# Patient Record
Sex: FEMALE | Race: WHITE | NOT HISPANIC OR LATINO | URBAN - METROPOLITAN AREA
[De-identification: names, ages, dates, MRNs, and addresses within clinical notes are randomized per-mention and may not be internally consistent; named-entity substitution may affect disease eponyms.]

---

## 2018-04-03 ENCOUNTER — INPATIENT (INPATIENT)
Facility: HOSPITAL | Age: 83
LOS: 2 days | Discharge: ANOTHER HOSPITAL | DRG: 65 | End: 2018-04-06
Attending: STUDENT IN AN ORGANIZED HEALTH CARE EDUCATION/TRAINING PROGRAM | Admitting: FAMILY MEDICINE
Payer: MEDICARE

## 2018-04-03 VITALS
HEIGHT: 61 IN | HEART RATE: 113 BPM | DIASTOLIC BLOOD PRESSURE: 73 MMHG | RESPIRATION RATE: 18 BRPM | TEMPERATURE: 97 F | WEIGHT: 210.1 LBS | OXYGEN SATURATION: 99 % | SYSTOLIC BLOOD PRESSURE: 178 MMHG

## 2018-04-03 DIAGNOSIS — I48.91 UNSPECIFIED ATRIAL FIBRILLATION: ICD-10-CM

## 2018-04-03 DIAGNOSIS — I25.10 ATHEROSCLEROTIC HEART DISEASE OF NATIVE CORONARY ARTERY WITHOUT ANGINA PECTORIS: ICD-10-CM

## 2018-04-03 DIAGNOSIS — I10 ESSENTIAL (PRIMARY) HYPERTENSION: ICD-10-CM

## 2018-04-03 DIAGNOSIS — I63.9 CEREBRAL INFARCTION, UNSPECIFIED: ICD-10-CM

## 2018-04-03 DIAGNOSIS — E11.9 TYPE 2 DIABETES MELLITUS WITHOUT COMPLICATIONS: ICD-10-CM

## 2018-04-03 DIAGNOSIS — Z98.890 OTHER SPECIFIED POSTPROCEDURAL STATES: Chronic | ICD-10-CM

## 2018-04-03 DIAGNOSIS — Z29.9 ENCOUNTER FOR PROPHYLACTIC MEASURES, UNSPECIFIED: ICD-10-CM

## 2018-04-03 LAB
ANION GAP SERPL CALC-SCNC: 12 MMOL/L — SIGNIFICANT CHANGE UP (ref 5–17)
APTT BLD: 28.3 SEC — SIGNIFICANT CHANGE UP (ref 27.5–37.4)
BUN SERPL-MCNC: 32 MG/DL — HIGH (ref 7–23)
CALCIUM SERPL-MCNC: 9.4 MG/DL — SIGNIFICANT CHANGE UP (ref 8.5–10.1)
CHLORIDE SERPL-SCNC: 112 MMOL/L — HIGH (ref 96–108)
CO2 SERPL-SCNC: 20 MMOL/L — LOW (ref 22–31)
CREAT SERPL-MCNC: 1.3 MG/DL — SIGNIFICANT CHANGE UP (ref 0.5–1.3)
GLUCOSE SERPL-MCNC: 228 MG/DL — HIGH (ref 70–99)
HCT VFR BLD CALC: 41.6 % — SIGNIFICANT CHANGE UP (ref 34.5–45)
HGB BLD-MCNC: 13.2 G/DL — SIGNIFICANT CHANGE UP (ref 11.5–15.5)
INR BLD: 1.22 RATIO — HIGH (ref 0.88–1.16)
MCHC RBC-ENTMCNC: 28.3 PG — SIGNIFICANT CHANGE UP (ref 27–34)
MCHC RBC-ENTMCNC: 31.8 GM/DL — LOW (ref 32–36)
MCV RBC AUTO: 89 FL — SIGNIFICANT CHANGE UP (ref 80–100)
PLATELET # BLD AUTO: 160 K/UL — SIGNIFICANT CHANGE UP (ref 150–400)
POTASSIUM SERPL-MCNC: 4.5 MMOL/L — SIGNIFICANT CHANGE UP (ref 3.5–5.3)
POTASSIUM SERPL-SCNC: 4.5 MMOL/L — SIGNIFICANT CHANGE UP (ref 3.5–5.3)
PROTHROM AB SERPL-ACNC: 13.3 SEC — HIGH (ref 9.8–12.7)
RBC # BLD: 4.68 M/UL — SIGNIFICANT CHANGE UP (ref 3.8–5.2)
RBC # FLD: 14.4 % — SIGNIFICANT CHANGE UP (ref 10.3–14.5)
SODIUM SERPL-SCNC: 144 MMOL/L — SIGNIFICANT CHANGE UP (ref 135–145)
WBC # BLD: 8.8 K/UL — SIGNIFICANT CHANGE UP (ref 3.8–10.5)
WBC # FLD AUTO: 8.8 K/UL — SIGNIFICANT CHANGE UP (ref 3.8–10.5)

## 2018-04-03 PROCEDURE — 93010 ELECTROCARDIOGRAM REPORT: CPT

## 2018-04-03 PROCEDURE — 93880 EXTRACRANIAL BILAT STUDY: CPT | Mod: 26

## 2018-04-03 PROCEDURE — 99223 1ST HOSP IP/OBS HIGH 75: CPT | Mod: AI,GC

## 2018-04-03 PROCEDURE — 99285 EMERGENCY DEPT VISIT HI MDM: CPT

## 2018-04-03 RX ORDER — VALSARTAN 80 MG/1
160 TABLET ORAL DAILY
Qty: 0 | Refills: 0 | Status: DISCONTINUED | OUTPATIENT
Start: 2018-04-03 | End: 2018-04-04

## 2018-04-03 RX ORDER — APIXABAN 2.5 MG/1
2.5 TABLET, FILM COATED ORAL EVERY 12 HOURS
Qty: 0 | Refills: 0 | Status: DISCONTINUED | OUTPATIENT
Start: 2018-04-03 | End: 2018-04-05

## 2018-04-03 RX ORDER — HYDRALAZINE HCL 50 MG
50 TABLET ORAL THREE TIMES A DAY
Qty: 0 | Refills: 0 | Status: DISCONTINUED | OUTPATIENT
Start: 2018-04-03 | End: 2018-04-06

## 2018-04-03 RX ORDER — DEXTROSE 50 % IN WATER 50 %
25 SYRINGE (ML) INTRAVENOUS ONCE
Qty: 0 | Refills: 0 | Status: DISCONTINUED | OUTPATIENT
Start: 2018-04-03 | End: 2018-04-06

## 2018-04-03 RX ORDER — ASPIRIN/CALCIUM CARB/MAGNESIUM 324 MG
81 TABLET ORAL DAILY
Qty: 0 | Refills: 0 | Status: DISCONTINUED | OUTPATIENT
Start: 2018-04-03 | End: 2018-04-03

## 2018-04-03 RX ORDER — DEXTROSE 50 % IN WATER 50 %
1 SYRINGE (ML) INTRAVENOUS ONCE
Qty: 0 | Refills: 0 | Status: DISCONTINUED | OUTPATIENT
Start: 2018-04-03 | End: 2018-04-06

## 2018-04-03 RX ORDER — GLUCAGON INJECTION, SOLUTION 0.5 MG/.1ML
1 INJECTION, SOLUTION SUBCUTANEOUS ONCE
Qty: 0 | Refills: 0 | Status: DISCONTINUED | OUTPATIENT
Start: 2018-04-03 | End: 2018-04-06

## 2018-04-03 RX ORDER — METOPROLOL TARTRATE 50 MG
50 TABLET ORAL DAILY
Qty: 0 | Refills: 0 | Status: DISCONTINUED | OUTPATIENT
Start: 2018-04-04 | End: 2018-04-04

## 2018-04-03 RX ORDER — SIMVASTATIN 20 MG/1
10 TABLET, FILM COATED ORAL AT BEDTIME
Qty: 0 | Refills: 0 | Status: DISCONTINUED | OUTPATIENT
Start: 2018-04-03 | End: 2018-04-06

## 2018-04-03 RX ORDER — INSULIN LISPRO 100/ML
VIAL (ML) SUBCUTANEOUS
Qty: 0 | Refills: 0 | Status: DISCONTINUED | OUTPATIENT
Start: 2018-04-03 | End: 2018-04-05

## 2018-04-03 RX ORDER — INSULIN LISPRO 100/ML
3 VIAL (ML) SUBCUTANEOUS ONCE
Qty: 0 | Refills: 0 | Status: COMPLETED | OUTPATIENT
Start: 2018-04-03 | End: 2018-04-03

## 2018-04-03 RX ORDER — SODIUM CHLORIDE 9 MG/ML
1000 INJECTION, SOLUTION INTRAVENOUS
Qty: 0 | Refills: 0 | Status: DISCONTINUED | OUTPATIENT
Start: 2018-04-03 | End: 2018-04-03

## 2018-04-03 RX ORDER — DEXTROSE 50 % IN WATER 50 %
12.5 SYRINGE (ML) INTRAVENOUS ONCE
Qty: 0 | Refills: 0 | Status: DISCONTINUED | OUTPATIENT
Start: 2018-04-03 | End: 2018-04-06

## 2018-04-03 RX ORDER — METOPROLOL TARTRATE 50 MG
50 TABLET ORAL DAILY
Qty: 0 | Refills: 0 | Status: DISCONTINUED | OUTPATIENT
Start: 2018-04-03 | End: 2018-04-03

## 2018-04-03 RX ORDER — VALSARTAN 80 MG/1
160 TABLET ORAL DAILY
Qty: 0 | Refills: 0 | Status: DISCONTINUED | OUTPATIENT
Start: 2018-04-03 | End: 2018-04-03

## 2018-04-03 RX ORDER — METOPROLOL TARTRATE 50 MG
50 TABLET ORAL ONCE
Qty: 0 | Refills: 0 | Status: COMPLETED | OUTPATIENT
Start: 2018-04-03 | End: 2018-04-03

## 2018-04-03 RX ADMIN — APIXABAN 2.5 MILLIGRAM(S): 2.5 TABLET, FILM COATED ORAL at 20:11

## 2018-04-03 RX ADMIN — Medication 3 UNIT(S): at 17:55

## 2018-04-03 RX ADMIN — SIMVASTATIN 10 MILLIGRAM(S): 20 TABLET, FILM COATED ORAL at 20:11

## 2018-04-03 RX ADMIN — Medication 50 MILLIGRAM(S): at 17:54

## 2018-04-03 RX ADMIN — VALSARTAN 160 MILLIGRAM(S): 80 TABLET ORAL at 20:11

## 2018-04-03 RX ADMIN — Medication 50 MILLIGRAM(S): at 21:14

## 2018-04-03 NOTE — H&P ADULT - NSHPSOCIALHISTORY_GEN_ALL_CORE
Lives with: son and mentally disable daughter. Patient takes care of daughter. Ambulates without assistance (uses surroundings to help her), able to use stairs.     Tobacco Usage: denies   Alcohol Usage: denies    Health Care Proxy: son

## 2018-04-03 NOTE — H&P ADULT - NSHPLABSRESULTS_GEN_ALL_CORE
13.2   8.8   )-----------( 160      ( 03 Apr 2018 11:13 )             41.6       04-03    144  |  112<H>  |  32<H>  ----------------------------<  228<H>  4.5   |  20<L>  |  1.30    Ca    9.4      03 Apr 2018 11:13      PT/INR - ( 03 Apr 2018 11:13 )   PT: 13.3 sec;   INR: 1.22 ratio         PTT - ( 03 Apr 2018 11:13 )  PTT:28.3 sec    I personally reviewed & interpreted the lab findings above; CBC unremarkable. CMP c/w renal insufficiency (unclear baseline)   I personally reviewed & interpreted the radiographic findings; CT head c/w indeterminant thalamic infract left and midbrain  I personally reviewed & interpreted the EKG findings; Atrial fibrillation

## 2018-04-03 NOTE — H&P ADULT - HISTORY OF PRESENT ILLNESS
82F with PMH A fib (on Eliquis), HTN, DMII presents with left eye blurry vision and right sided lower extremity weakness since Saturday (4 days ago). Patient reports feeling unsteady on Saturday and "slowly went down" after standing up from her couch. Denies LOC. Denies head trauma. Was able to lift herself back up and go to bed. Son came in and noticed patient's left eye was closed and patient reported blurry vision. Son used hot compresses on left eye with minimal relief. Patient reports feeling unsteady on Sunday and leaning toward her right side. Patient "went down" on Sunday and today. Denies actually falling, reports feeling unsteady and slowly going to the floor. She is usually unable to get herself up after being on the floor and son has to help her. Per son, she rarely falls. Last fall was 1 year ago, and son assisted her in getting up at that time. Patient usually ambulates without a cane or walker, uses surroundings for support and walks slowly. Patient able to use stairs. Per son, patient saw Dr. Mtz a few months ago and had a nuclear stress test which was normal. Patient reports decreasing Eliquis to 2.5mg daily from 5mg daily because she noticed BRBPR. Of note, Eliquis bottle says 2.5mg BID; but patient reports only taking medication once a day. Patient also reports using Hydralazine BID, bottle says Hydralazine TID.     In the ED temp 97.3, , /73, RR 18, SpO2 99% RA. BUN/Cr 32/1.3. Glucose 228. Chest xray showed clear lungs. CT head showed no acute intracranial hemorrhage, mass effect, or shift of the midline structures. Age indeterminate infarct at the junction of the left thalamus and midbrain. Moderate severity microvascular disease. EKG showed a fib RVR at 115. 82F with PMH A fib (on Eliquis), HTN, DMII, CAD w/ stent to LAD (2006) presents with left eye blurry vision and right sided lower extremity weakness since Saturday (4 days ago). Patient reports feeling unsteady on Saturday and "slowly went down" after standing up from her couch. Denies LOC. Denies head trauma. Was able to lift herself back up and go to bed. Son came in and noticed patient's left eye was closed and patient reported blurry vision. Son used hot compresses on left eye with minimal relief. Patient reports feeling unsteady on Sunday and leaning toward her right side. Patient "went down" on Sunday and today. Denies actually falling, reports feeling unsteady and slowly going to the floor. She is usually unable to get herself up after being on the floor and son has to help her. Per son, she rarely falls. Last fall was 1 year ago, and son assisted her in getting up at that time. Patient usually ambulates without a cane or walker, uses surroundings for support and walks slowly. Patient able to use stairs. Per son, patient saw Dr. Mtz a few months ago and had a nuclear stress test which was normal. Patient reports decreasing Eliquis to 2.5mg daily from 5mg daily because she noticed BRBPR. Of note, Eliquis bottle says 2.5mg BID; but patient reports only taking medication once a day. Patient also reports using Hydralazine BID, bottle says Hydralazine TID.     In the ED temp 97.3, , /73, RR 18, SpO2 99% RA. BUN/Cr 32/1.3. Glucose 228. Chest xray showed clear lungs. CT head showed no acute intracranial hemorrhage, mass effect, or shift of the midline structures. Age indeterminate infarct at the junction of the left thalamus and midbrain. Moderate severity microvascular disease. EKG showed a fib RVR at 115.

## 2018-04-03 NOTE — H&P ADULT - PROBLEM SELECTOR PLAN 2
Patient noncompliant with anticoagulation medications.   -Continue Eliquis 2.5mg BID, monitor for signs of bleeding  -Rate controlled with Metoprolol 50mg daily. EKG showed RVR, may need to increase dose.   -Cardio, Dr. Mtz consulted.   -ECHO ordered, patient had previous echo in June.

## 2018-04-03 NOTE — H&P ADULT - NSHPREVIEWOFSYSTEMS_GEN_ALL_CORE
CONSTITUTIONAL: No fever, No chills, No fatigue  EYES: No eye pain or discharge, positive blurry/double vision   ENMT:  No ear pain, No nose bleed; No sinus or throat pain  NECK: No pain, No stiffness  RESPIRATORY: No cough, wheezing, No  hemoptysis; No shortness of breath  CARDIOVASCULAR: No chest pain, palpitations, leg swelling  GASTROINTESTINAL: No abdominal or epigastric pain. No nausea, No vomiting; No diarrhea or constipation.   GENITOURINARY: No dysuria, No frequency, No urgency, No hematuria, or incontinence  NEUROLOGICAL: alert and oriented x 3,  No headaches, No dizziness, No numbness,  MUSCULOSKELETAL: No joint pain or swelling; No muscle pain, No back pain, No extremity pain  PSYCHIATRIC: No depression, anxiety, mood swings, or difficulty sleeping

## 2018-04-03 NOTE — CONSULT NOTE ADULT - SUBJECTIVE AND OBJECTIVE BOX
[facility] Cardiology    CHIEF COMPLAINT: Patient is a 82y old  Female who presents with a chief complaint of CVA (03 Apr 2018 16:26)      HPI:  82F with PMH A fib (on Eliquis), HTN, DMII presents with left eye blurry vision and right sided lower extremity weakness since Saturday (4 days ago). Patient reports feeling unsteady on Saturday and "slowly went down" after standing up from her couch. Denies LOC. Denies head trauma. Was able to lift herself back up and go to bed. Son came in and noticed patient's left eye was closed and patient reported blurry vision. Son used hot compresses on left eye with minimal relief. Patient reports feeling unsteady on Sunday and leaning toward her right side. Patient "went down" on Sunday and today. Denies actually falling, reports feeling unsteady and slowly going to the floor. She is usually unable to get herself up after being on the floor and son has to help her. Per son, she rarely falls. Last fall was 1 year ago, and son assisted her in getting up at that time. Patient usually ambulates without a cane or walker, uses surroundings for support and walks slowly. Patient able to use stairs. Per son, patient saw Dr. Mtz a few months ago and had a nuclear stress test which was normal. Patient reports decreasing Eliquis to 2.5mg daily from 5mg daily because she noticed BRBPR. Of note, Eliquis bottle says 2.5mg BID; but patient reports only taking medication once a day. Patient also reports using Hydralazine BID, bottle says Hydralazine TID.     In the ED temp 97.3, , /73, RR 18, SpO2 99% RA. BUN/Cr 32/1.3. Glucose 228. Chest xray showed clear lungs. CT head showed no acute intracranial hemorrhage, mass effect, or shift of the midline structures. Age indeterminate infarct at the junction of the left thalamus and midbrain. Moderate severity microvascular disease. EKG showed a fib RVR at 115. (03 Apr 2018 16:26)    Denies CP    PAST MEDICAL & SURGICAL HISTORY:  HTN (hypertension)  DMII (diabetes mellitus, type 2)  Afib  H/O total hysterectomy    SOCIAL HISTORY: no tobacco    FAMILY HISTORY:  No pertinent family history in first degree relatives      MEDICATIONS  (STANDING):  apixaban 2.5 milliGRAM(s) Oral every 12 hours  dextrose 50% Injectable 12.5 Gram(s) IV Push once  dextrose 50% Injectable 25 Gram(s) IV Push once  dextrose 50% Injectable 25 Gram(s) IV Push once  hydrALAZINE 50 milliGRAM(s) Oral three times a day  insulin lispro (HumaLOG) corrective regimen sliding scale   SubCutaneous three times a day before meals  simvastatin 10 milliGRAM(s) Oral at bedtime  valsartan 160 milliGRAM(s) Oral daily    MEDICATIONS  (PRN):  dextrose Gel 1 Dose(s) Oral once PRN Blood Glucose LESS THAN 70 milliGRAM(s)/deciliter  glucagon  Injectable 1 milliGRAM(s) IntraMuscular once PRN Glucose LESS THAN 70 milligrams/deciliter      Allergies    No Known Allergies    Intolerances      REVIEW OF SYSTEMS:  CONSTITUTIONAL:  weakness, no fevers   EYES/ENT: visual changes  NECK: No pain or stiffness  RESPIRATORY: No shortness of breath  CARDIOVASCULAR: No chest pain or palpitations  GASTROINTESTINAL: No abdominal pain  GENITOURINARY: No hematuria  NEUROLOGICAL: weakness  SKIN: No rash  All other review of systems is negative unless indicated above    VITAL SIGNS:   Vital Signs Last 24 Hrs  T(C): 36.3 (03 Apr 2018 10:16), Max: 36.3 (03 Apr 2018 10:16)  T(F): 97.3 (03 Apr 2018 10:16), Max: 97.3 (03 Apr 2018 10:16)  HR: 115 (03 Apr 2018 17:09) (113 - 115)  BP: 150/80 (03 Apr 2018 17:09) (150/80 - 178/73)  BP(mean): --  RR: 18 (03 Apr 2018 17:09) (18 - 18)  SpO2: 99% (03 Apr 2018 17:09) (99% - 99%)  I&O's Summary    PHYSICAL EXAM:  Constitutional: NAD  Neurological: Alert and oriented  HEENT: EOMI, no JVD  Cardiovascular: S1 and S2, irr, 1/6 sys murmur  Pulmonary: breath sounds bilaterally dec at bases  Gastrointestinal: Bowel Sounds present, soft, nontender  Ext: peripheral edema, mild B/L pitting  Skin: No rashes, No cyanosis.  Psych:  Mood & affect appropriate   LABS: All Labs Reviewed:                        13.2   8.8   )-----------( 160      ( 03 Apr 2018 11:13 )             41.6     04-03    144  |  112<H>  |  32<H>  ----------------------------<  228<H>  4.5   |  20<L>  |  1.30    Ca    9.4      03 Apr 2018 11:13      PT/INR - ( 03 Apr 2018 11:13 )   PT: 13.3 sec;   INR: 1.22 ratio         PTT - ( 03 Apr 2018 11:13 )  PTT:28.3 sec      Blood Culture:     82F with PMH A fib (on Eliquis), HTN, DMII remote LAD PCI >10 years ago, presents with left eye blurry vision and right sided lower extremity weakness since Saturday (4 days ago). Patient reports feeling unsteady on Saturday and "slowly went down" after standing up from her couch. Denies LOC. Denies head trauma. Was able to lift herself back up and go to bed. Son came in and noticed patient's left eye was closed and patient reported blurry vision. Son used hot compresses on left eye with minimal relief. Patient reports feeling unsteady on Sunday and leaning toward her right side. Patient "went down" on Sunday and today. Denies actually falling, reports feeling unsteady and slowly going to the floor. She is usually unable to get herself up after being on the floor and son has to help her. Per son, she rarely falls. Last fall was 1 year ago, and son assisted her in getting up at that time. Patient usually ambulates without a cane or walker, uses surroundings for support and walks slowly. Patient able to use stairs. Per son, patient saw Dr. Mtz a few months ago and had a nuclear stress test which was normal. Patient reports decreasing Eliquis to 2.5mg daily from 5mg daily because she noticed BRBPR. Of note, Eliquis bottle says 2.5mg BID; but patient reports only taking medication once a day. Patient also reports using Hydralazine BID, bottle says Hydralazine TID.     In the ED temp 97.3, , /73, RR 18, SpO2 99% RA. BUN/Cr 32/1.3. Glucose 228. Chest xray showed clear lungs. CT head showed no acute intracranial hemorrhage, mass effect, or shift of the midline structures. Age indeterminate infarct at the junction of the left thalamus and midbrain. Moderate severity microvascular disease. EKG showed a fib RVR at 115. (03 Apr 2018 16:26)    RADIOLOGY/EKG: Rapid AF, non-specific STs  2D ECHO: 6/2017: hyperdynamic EF, mod MR, mod PH  Lexiscan 7/2017: Low risk study, normal EF    Pt has only been taking Eliquis ONCE per day >3 months despite very clear instructions in my office at her last visit in December (while accompanied by her son).  REC: Resume outpt meds including Eliquis 2.5 mg PO BID for now  Eliquis was prior 5 mg but decreased to 2.5 b/c BRBPR in July 2017 which the pt was non-compliant with seeing GI.  Pt still reports blood with morning BM daily.  Would have GI see pt prior to uptitrating Eliquis to 5 BID  ASA 82 given LAD PCI if ok with neuro  Check ECHO  Will follow

## 2018-04-03 NOTE — H&P ADULT - PROBLEM SELECTOR PROBLEM 3
DMII (diabetes mellitus, type 2) Coronary artery disease involving native coronary artery of native heart without angina pectoris

## 2018-04-03 NOTE — ED ADULT TRIAGE NOTE - CHIEF COMPLAINT QUOTE
son states "I think my moms having a stroke" Left eye closing, right leg weakness - symptoms began Saturday morning.

## 2018-04-03 NOTE — H&P ADULT - PROBLEM SELECTOR PLAN 5
IMPROVE VTE Individual Risk Assessment          RISK                                                          Points  [  ] Previous VTE                                                3  [  ] Thrombophilia                                             2  [  ] Lower limb paralysis                                   2        (unable to hold up >15 seconds)    [  ] Current Cancer                                             2         (within 6 months)  [  ] Immobilization > 24 hrs                              1  [  ] ICU/CCU stay > 24 hours                             1  [ X ] Age > 60                                                         1    IMPROVE VTE Score: 1    DVT prophylaxis: On Eliquis  fall precautions Allow for permissive hypertension in setting of CVA  -Continue Hydralazine, Valsartan, and Metoprolol with hold parameters

## 2018-04-03 NOTE — H&P ADULT - PROBLEM SELECTOR PLAN 1
CT head showed age indeterminate infarct at the junction of the left thalamus and   midbrain.  -Admit to telemetry, monitor of tele for arrhythmia  -Follow up MRI/MRA, U/S Carotids, ECHO  -Follow up lipid panel, TSH, A1C  -Physical therapy evaluation  -Start Zocor 10mg per neurology  -Neuro check q4 hours  -Fall precautions  -Neuro recs appreciated CT head showed age indeterminate infarct at the junction of the left thalamus and   midbrain.  -Admit to telemetry, monitor of tele for arrhythmia  -Follow up MRI/MRA, U/S Carotids, ECHO  -Follow up lipid panel, TSH, A1C  -Physical therapy evaluation  -Start Zocor 10mg per neurology  -Start ASA81 daily  -Neuro check q4 hours  -Fall precautions  -Neuro recs appreciated

## 2018-04-03 NOTE — H&P ADULT - PROBLEM SELECTOR PLAN 6
IMPROVE VTE Individual Risk Assessment          RISK                                                          Points  [  ] Previous VTE                                                3  [  ] Thrombophilia                                             2  [  ] Lower limb paralysis                                   2        (unable to hold up >15 seconds)    [  ] Current Cancer                                             2         (within 6 months)  [  ] Immobilization > 24 hrs                              1  [  ] ICU/CCU stay > 24 hours                             1  [ X ] Age > 60                                                         1    IMPROVE VTE Score: 1    DVT prophylaxis: On Eliquis  fall precautions

## 2018-04-03 NOTE — H&P ADULT - ASSESSMENT
82F with PMH A fib (on Eliquis), HTN, DMII presents with left eye blurry vision and right sided lower extremity weakness admitted for CVA.

## 2018-04-03 NOTE — H&P ADULT - ATTENDING COMMENTS
Patient seen and examined at bedside. My involvement in care of patient was for short duration of this evening. Care to be resumed by full time hospitalist. Agree with the resident's note above and changes/addendum made where appropriate with the following addition;     This is a 81 yo F w/ above medical hx presenting w/ concerns for acute CVA event in the setting of noncompliance w/ her Eliquis regimen. Patient reportedly had hematochezia attributed presumably to hemorrhoids in July 2017 at which point her Eliquis was reduced from 5mg BID to 2.5mg BID. However, since that time she has only taken 2.5mg once daily and denies missing any doses. Her bleeding is intermittent and last episode is likely >5 days ago. She never had colonoscopy to her knowledge and is not taking additional NSAIDs or ASA therapy. Vital/examination as listed above with following addendum, on my rectal exam stool is brown and non-bleeding hemorrhoids. Labs, imaging, and EKG reviewed and interpreted by me. Care d/w neurology team overnight and plan is to hold ASA and neurology is ok w/ Eliquis 2.5mg BID for now. Spoke also to cardiology Dr. Mtz who agrees patient would benefit from 2.5mg BID dosing of Eliquis and he prefers to do 5mg BID if from gastrointestinal standpoint patient is stable. I called GI service, spoke to Dr. Zarate who will evaluate patient and is ok w/ 2.5 mg BID for now given stable H&H. Plan d/w family also at bedside. Patient seen and examined at bedside. My involvement in care of patient was for short duration of this evening. Care to be resumed by full time hospitalist. Agree with the resident's note above and changes/addendum made where appropriate with the following addition;     This is a 83 yo F w/ above medical hx presenting w/ concerns for acute CVA event in the setting of noncompliance w/ her Eliquis regimen. Patient reportedly had hematochezia attributed presumably to hemorrhoids in July 2017 at which point her Eliquis was reduced from 5mg BID to 2.5mg BID. However, since that time she has only taken 2.5mg once daily and denies missing any doses. Her bleeding is intermittent and last episode is likely >5 days ago. She never had colonoscopy to her knowledge and is not taking additional NSAIDs or ASA therapy. Vital/examination as listed above with following addendum, on my rectal exam stool is brown and non-bleeding hemorrhoids. Labs, imaging, and EKG reviewed and interpreted by me. Care d/w neurology team overnight and plan is to hold ASA and neurology is ok w/ Eliquis 2.5mg BID for now. Neuro recommends holding ASA therapy for now. Spoke also to cardiology Dr. Mtz who agrees patient would benefit from 2.5mg BID dosing of Eliquis and he prefers to do 5mg BID if from gastrointestinal standpoint patient is stable. I called GI service, spoke to Dr. Zarate who will evaluate patient and is ok w/ 2.5 mg BID for now given stable H&H. Plan d/w family also at bedside. Patient seen and examined at bedside. My involvement in care of patient was for short duration of this evening. Care to be resumed by full time hospitalist. Agree with the resident's note above and changes/addendum made where appropriate with the following addition;     This is a 81 yo F w/ above medical hx presenting w/ concerns for acute CVA event in the setting of noncompliance w/ her Eliquis regimen. Patient reportedly had hematochezia attributed presumably to hemorrhoids in July 2017 at which point her Eliquis was reduced from 5mg BID to 2.5mg BID. However, since that time she has only taken 2.5mg once daily and denies missing any doses. Her bleeding is intermittent and always hematochezia, and with BM only. Last BM earlier today. She never had colonoscopy to her knowledge and is not taking additional NSAIDs or ASA therapy. Vital/examination as listed above with following addendum, on my rectal exam stool is brown and non-bleeding hemorrhoids. Labs, imaging, and EKG reviewed and interpreted by me. Care d/w neurology team overnight and plan is to hold ASA and neurology is ok w/ Eliquis 2.5mg BID for now. Neuro recommends holding ASA therapy for now. Spoke also to cardiology Dr. Mtz who agrees patient would benefit from 2.5mg BID dosing of Eliquis and he prefers to do 5mg BID if from gastrointestinal standpoint patient is stable. I called GI service, spoke to Dr. Zarate who will evaluate patient and is ok w/ 2.5 mg BID for now given stable H&H. Plan d/w family also at bedside.

## 2018-04-03 NOTE — ED PROVIDER NOTE - EYES, MLM
Clear bilaterally, pupils equal, round and reactive to light. Clear bilaterally, pupils equal, round and reactive to light. Slight LUE ptosis

## 2018-04-03 NOTE — H&P ADULT - PROBLEM SELECTOR PLAN 3
-Follow up A1C  -Low dose sliding scale with hypoglycemia protocol  -Accuchecks d/w neuro and rec to hold ASA for now.  No role for plavix as stent was >1 yr ago.   c/w beta-blocker and statin therapy

## 2018-04-03 NOTE — H&P ADULT - NSHPPHYSICALEXAM_GEN_ALL_CORE
Vital Signs Last 24 Hrs  T(C): 36.3 (03 Apr 2018 10:16), Max: 36.3 (03 Apr 2018 10:16)  T(F): 97.3 (03 Apr 2018 10:16), Max: 97.3 (03 Apr 2018 10:16)  HR: 113 (03 Apr 2018 10:16) (113 - 113)  BP: 178/73 (03 Apr 2018 10:16) (178/73 - 178/73)  BP(mean): --  RR: 18 (03 Apr 2018 10:16) (18 - 18)  SpO2: 99% (03 Apr 2018 10:16) (99% - 99%)  [X ] room air   [ ] 02    GENERAL:  No acute distress, well appearing, keeps left eye closed (but opens it when asked)  HEAD:  AT/NC  ENMT: EOMI, PERRL  NECK:  supple     NERVOUS SYSTEM:  Alert & Oriented X3, no focal deficits, 5/5 upper extremity muscle strength bilaterally, 5/5 lower extremity muscle strength bilaterally, good  strength, CN II-XII grossly intact, sensation to light touch intact   CHEST/LUNG: Clear to auscultation bilaterally, no wheezing  HEART: irregular rate and rhythm, no murmurs, rubs, or gallops   ABDOMEN:  soft, nontender, nondistended, positive bowel sounds  EXTREMITIES: +1 pitting edema in L>R  (chronic per son)  SKIN: chronic venous stasis skin changes L>R Vital Signs Last 24 Hrs  T(C): 36.3 (03 Apr 2018 10:16), Max: 36.3 (03 Apr 2018 10:16)  T(F): 97.3 (03 Apr 2018 10:16), Max: 97.3 (03 Apr 2018 10:16)  HR: 113 (03 Apr 2018 10:16) (113 - 113)  BP: 178/73 (03 Apr 2018 10:16) (178/73 - 178/73)  BP(mean): --  RR: 18 (03 Apr 2018 10:16) (18 - 18)  SpO2: 99% (03 Apr 2018 10:16) (99% - 99%)  [X ] room air   [ ] 02    GENERAL:  No acute distress, well appearing, keeps left eye closed (but opens it when asked)  HEAD:  AT/NC  ENMT: EOMI, PERRL  NECK:  supple     NERVOUS SYSTEM:  Alert & Oriented X3, +left eye ptosis, 5/5 upper extremity muscle strength bilaterally, 5/5 lower extremity muscle strength bilaterally, good  strength, CN II-XII grossly intact, sensation to light touch intact . Pupils equally reactive to light and accomodation. Peripheral visual fields intact.   CHEST/LUNG: Clear to auscultation bilaterally, no wheezing  HEART: irregular rate and rhythm, 2/6 systolic murmur throughout precordium, no rubs, or gallops   ABDOMEN:  soft, nontender, nondistended, positive bowel sounds  EXTREMITIES: +1 pitting edema in L>R  (chronic per son)  SKIN: chronic venous stasis skin changes L>R; no signs of infection (warmth, tenderness, crepitus).

## 2018-04-03 NOTE — ED PROVIDER NOTE - OBJECTIVE STATEMENT
83 yo white female with weakness more so in right lower extremity since this past Saturday, ( 4-days ago) with associated slight blurred vision in left eye. No nausea, vomiting, headache, chest pain, SOB and or neck or back pain.

## 2018-04-04 DIAGNOSIS — I10 ESSENTIAL (PRIMARY) HYPERTENSION: ICD-10-CM

## 2018-04-04 DIAGNOSIS — K92.1 MELENA: ICD-10-CM

## 2018-04-04 DIAGNOSIS — I48.2 CHRONIC ATRIAL FIBRILLATION: ICD-10-CM

## 2018-04-04 DIAGNOSIS — K62.5 HEMORRHAGE OF ANUS AND RECTUM: ICD-10-CM

## 2018-04-04 DIAGNOSIS — K59.01 SLOW TRANSIT CONSTIPATION: ICD-10-CM

## 2018-04-04 DIAGNOSIS — K64.9 UNSPECIFIED HEMORRHOIDS: ICD-10-CM

## 2018-04-04 DIAGNOSIS — E66.9 OBESITY, UNSPECIFIED: ICD-10-CM

## 2018-04-04 LAB
ANION GAP SERPL CALC-SCNC: 9 MMOL/L — SIGNIFICANT CHANGE UP (ref 5–17)
BUN SERPL-MCNC: 26 MG/DL — HIGH (ref 7–23)
CALCIUM SERPL-MCNC: 8.9 MG/DL — SIGNIFICANT CHANGE UP (ref 8.5–10.1)
CHLORIDE SERPL-SCNC: 111 MMOL/L — HIGH (ref 96–108)
CHOLEST SERPL-MCNC: 157 MG/DL — SIGNIFICANT CHANGE UP (ref 10–199)
CO2 SERPL-SCNC: 25 MMOL/L — SIGNIFICANT CHANGE UP (ref 22–31)
CREAT SERPL-MCNC: 1.3 MG/DL — SIGNIFICANT CHANGE UP (ref 0.5–1.3)
GLUCOSE SERPL-MCNC: 135 MG/DL — HIGH (ref 70–99)
HBA1C BLD-MCNC: 7.4 % — HIGH (ref 4–5.6)
HCT VFR BLD CALC: 43.3 % — SIGNIFICANT CHANGE UP (ref 34.5–45)
HDLC SERPL-MCNC: 51 MG/DL — SIGNIFICANT CHANGE UP (ref 40–125)
HGB BLD-MCNC: 13.8 G/DL — SIGNIFICANT CHANGE UP (ref 11.5–15.5)
LIPID PNL WITH DIRECT LDL SERPL: 83 MG/DL — SIGNIFICANT CHANGE UP
MCHC RBC-ENTMCNC: 28.5 PG — SIGNIFICANT CHANGE UP (ref 27–34)
MCHC RBC-ENTMCNC: 31.8 GM/DL — LOW (ref 32–36)
MCV RBC AUTO: 89.6 FL — SIGNIFICANT CHANGE UP (ref 80–100)
PLATELET # BLD AUTO: 184 K/UL — SIGNIFICANT CHANGE UP (ref 150–400)
POTASSIUM SERPL-MCNC: 4.6 MMOL/L — SIGNIFICANT CHANGE UP (ref 3.5–5.3)
POTASSIUM SERPL-SCNC: 4.6 MMOL/L — SIGNIFICANT CHANGE UP (ref 3.5–5.3)
RBC # BLD: 4.83 M/UL — SIGNIFICANT CHANGE UP (ref 3.8–5.2)
RBC # FLD: 14.5 % — SIGNIFICANT CHANGE UP (ref 10.3–14.5)
SODIUM SERPL-SCNC: 145 MMOL/L — SIGNIFICANT CHANGE UP (ref 135–145)
TOTAL CHOLESTEROL/HDL RATIO MEASUREMENT: 3.1 RATIO — LOW (ref 3.3–7.1)
TRIGL SERPL-MCNC: 115 MG/DL — SIGNIFICANT CHANGE UP (ref 10–149)
TSH SERPL-MCNC: 2.12 UIU/ML — SIGNIFICANT CHANGE UP (ref 0.36–3.74)
WBC # BLD: 7.2 K/UL — SIGNIFICANT CHANGE UP (ref 3.8–10.5)
WBC # FLD AUTO: 7.2 K/UL — SIGNIFICANT CHANGE UP (ref 3.8–10.5)

## 2018-04-04 PROCEDURE — 99233 SBSQ HOSP IP/OBS HIGH 50: CPT

## 2018-04-04 PROCEDURE — 70551 MRI BRAIN STEM W/O DYE: CPT | Mod: 26

## 2018-04-04 RX ORDER — VALSARTAN 80 MG/1
160 TABLET ORAL DAILY
Qty: 0 | Refills: 0 | Status: DISCONTINUED | OUTPATIENT
Start: 2018-04-04 | End: 2018-04-06

## 2018-04-04 RX ORDER — ALPRAZOLAM 0.25 MG
0.25 TABLET ORAL ONCE
Qty: 0 | Refills: 0 | Status: DISCONTINUED | OUTPATIENT
Start: 2018-04-04 | End: 2018-04-04

## 2018-04-04 RX ORDER — SENNA PLUS 8.6 MG/1
2 TABLET ORAL AT BEDTIME
Qty: 0 | Refills: 0 | Status: DISCONTINUED | OUTPATIENT
Start: 2018-04-04 | End: 2018-04-06

## 2018-04-04 RX ORDER — VALSARTAN 80 MG/1
1 TABLET ORAL
Qty: 0 | Refills: 0 | COMMUNITY

## 2018-04-04 RX ORDER — POLYETHYLENE GLYCOL 3350 17 G/17G
17 POWDER, FOR SOLUTION ORAL
Qty: 0 | Refills: 0 | Status: DISCONTINUED | OUTPATIENT
Start: 2018-04-04 | End: 2018-04-06

## 2018-04-04 RX ORDER — HYDROCORTISONE 1 %
1 OINTMENT (GRAM) TOPICAL
Qty: 0 | Refills: 0 | Status: DISCONTINUED | OUTPATIENT
Start: 2018-04-04 | End: 2018-04-06

## 2018-04-04 RX ORDER — METOPROLOL TARTRATE 50 MG
50 TABLET ORAL DAILY
Qty: 0 | Refills: 0 | Status: DISCONTINUED | OUTPATIENT
Start: 2018-04-04 | End: 2018-04-05

## 2018-04-04 RX ORDER — DOCUSATE SODIUM 100 MG
100 CAPSULE ORAL THREE TIMES A DAY
Qty: 0 | Refills: 0 | Status: DISCONTINUED | OUTPATIENT
Start: 2018-04-04 | End: 2018-04-06

## 2018-04-04 RX ADMIN — VALSARTAN 160 MILLIGRAM(S): 80 TABLET ORAL at 06:13

## 2018-04-04 RX ADMIN — Medication 3: at 13:29

## 2018-04-04 RX ADMIN — Medication 100 MILLIGRAM(S): at 21:59

## 2018-04-04 RX ADMIN — Medication 50 MILLIGRAM(S): at 06:13

## 2018-04-04 RX ADMIN — Medication 1: at 17:37

## 2018-04-04 RX ADMIN — Medication 50 MILLIGRAM(S): at 13:30

## 2018-04-04 RX ADMIN — APIXABAN 2.5 MILLIGRAM(S): 2.5 TABLET, FILM COATED ORAL at 17:43

## 2018-04-04 RX ADMIN — APIXABAN 2.5 MILLIGRAM(S): 2.5 TABLET, FILM COATED ORAL at 06:13

## 2018-04-04 RX ADMIN — SIMVASTATIN 10 MILLIGRAM(S): 20 TABLET, FILM COATED ORAL at 21:59

## 2018-04-04 RX ADMIN — POLYETHYLENE GLYCOL 3350 17 GRAM(S): 17 POWDER, FOR SOLUTION ORAL at 17:42

## 2018-04-04 RX ADMIN — Medication 100 MILLIGRAM(S): at 13:30

## 2018-04-04 RX ADMIN — SENNA PLUS 2 TABLET(S): 8.6 TABLET ORAL at 21:59

## 2018-04-04 RX ADMIN — Medication 0.25 MILLIGRAM(S): at 11:42

## 2018-04-04 NOTE — CONSULT NOTE ADULT - PROBLEM SELECTOR RECOMMENDATION 3
-care per neurology/primary team -pt currently without pain, if becomes symptomatic recommend Anusol-HC rectal suppository bid, sitz baths bid/tucks  -continue bowel regimen as above  -ensure adequate hydration/dietary fiber intake

## 2018-04-04 NOTE — DISCHARGE NOTE ADULT - OTHER SIGNIFICANT FINDINGS
On day of transfer, the patient c/o R great toe pain w/ corresponding redness and trace edema. Describes pain as 8/10, throbbing and nonradiating. She has persistent L eyelid ptosis and admits vision is obstructed by eyelid but denies blurred vision when holding her eyelid up. No other complaints today    ROS:   CONSTITUTIONAL: denies fever, chills, fatigue, weakness  HEENT: denies blurred vision, sore throat  SKIN: denies new lesions, rash  CARDIOVASCULAR: denies chest pain, chest pressure. as per hpi  RESPIRATORY: denies shortness of breath, sputum production. as per hpi  GASTROINTESTINAL: denies nausea, vomiting, diarrhea, abdominal pain  GENITOURINARY: denies dysuria, discharge  NEUROLOGICAL: as per HPI  MUSCULOSKELETAL: as per HPI  HEMATOLOGIC: denies gross bleeding, bruising  LYMPHATICS: denies enlarged lymph nodes, extremity swelling  PSYCHIATRIC: denies recent changes in anxiety, depression  ENDOCRINOLOGIC: denies sweating, cold or heat intolerance    PE:   GENERAL: patient appears comfortable, [place], appropriate and conversational. L sided ptosis  EYES: sclera clear, no exudates  ENMT: oropharynx clear without erythema, no exudates, moist mucous membranes  NECK: supple, soft, no thyromegaly noted  LUNGS: good air entry bilaterally, clear to auscultation, symmetric breath sounds, no wheezing or rhonchi appreciated  HEART: soft S1/S2, regular rate and rhythm, no murmurs noted, no lower extremity edema  GASTROINTESTINAL: abdomen is soft, nontender, nondistended, normoactive bowel sounds, no palpable masses  INTEGUMENT: good skin turgor, no lesions noted  MUSCULOSKELETAL: no clubbing or cyanosis, no obvious deformity. Base of R hallux w/ mild erythema, mild edema, obvious tenderness to palpation of the deandra prominence at MTP joint  NEUROLOGIC: awake, alert, oriented x3, good muscle tone in 4 extremities, motor strength in  strength, biceps, triceps, hip flexors, plantarflexors 5/5 b/l and symmetric. no sensory deficits noted. speech is clear.   HEME/LYMPH: no palpable supraclavicular nodules, no obvious ecchymosis or petechiae On day of transfer, the patient c/o R great toe pain w/ corresponding redness and trace edema. Describes pain as 8/10, throbbing and nonradiating. She has persistent L eyelid ptosis and admits vision is obstructed by eyelid but denies blurred vision when holding her eyelid up. No other complaints today    ROS:   CONSTITUTIONAL: denies fever, chills, fatigue, weakness  HEENT: denies blurred vision, sore throat  SKIN: denies new lesions, rash  CARDIOVASCULAR: denies chest pain, chest pressure. as per hpi  RESPIRATORY: denies shortness of breath, sputum production. as per hpi  GASTROINTESTINAL: denies nausea, vomiting, diarrhea, abdominal pain  GENITOURINARY: denies dysuria, discharge  NEUROLOGICAL: as per HPI  MUSCULOSKELETAL: as per HPI  HEMATOLOGIC: denies gross bleeding, bruising  LYMPHATICS: denies enlarged lymph nodes, extremity swelling  PSYCHIATRIC: denies recent changes in anxiety, depression  ENDOCRINOLOGIC: denies sweating, cold or heat intolerance    PE:   GENERAL: patient appears comfortable, [place], appropriate and conversational. L sided ptosis  EYES: sclera clear, no exudates  ENMT: oropharynx clear without erythema, no exudates, moist mucous membranes  NECK: supple, soft, no thyromegaly noted  LUNGS: good air entry bilaterally, clear to auscultation, symmetric breath sounds, no wheezing or rhonchi appreciated  HEART: soft S1/S2, regular rate and rhythm, no murmurs noted, no lower extremity edema  GASTROINTESTINAL: abdomen is soft, nontender, nondistended, normoactive bowel sounds, no palpable masses  INTEGUMENT: good skin turgor, no lesions noted  MUSCULOSKELETAL: no clubbing or cyanosis, no obvious deformity. Base of R hallux w/ mild erythema, mild edema, obvious tenderness to palpation of the deandra prominence at MTP joint  NEUROLOGIC: awake, alert, oriented x3, good muscle tone in 4 extremities, motor strength in  strength, biceps, triceps, hip flexors, plantarflexors 5/5 b/l and symmetric. no sensory deficits noted. speech is clear.   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

## 2018-04-04 NOTE — DISCHARGE NOTE ADULT - MEDICATION SUMMARY - MEDICATIONS TO TAKE
I will START or STAY ON the medications listed below when I get home from the hospital:    acetaminophen 325 mg oral tablet  -- 2 tab(s) by mouth every 6 hours, As needed, For Temp greater than 38 C (100.4 F)  -- Indication: For Temp (as needed)    acetaminophen 325 mg oral tablet  -- 2 tab(s) by mouth every 6 hours, As needed, Mild Pain (1 - 3)  -- Indication: For Pain (as needed)    oxyCODONE-acetaminophen 5 mg-325 mg oral tablet  -- 1 tab(s) by mouth every 4 hours, As needed, Moderate Pain (4 - 6)  -- Indication: For Pain (as needed)    oxyCODONE-acetaminophen 5 mg-325 mg oral tablet  -- 1 tab(s) by mouth every 4 hours, As needed, Severe Pain (7 - 10)  -- Indication: For Pain (as needed)    valsartan 160 mg oral tablet  -- 1 tab(s) by mouth once a day  -- Indication: For HTN (hypertension)    apixaban 5 mg oral tablet  -- 1 tab(s) by mouth every 12 hours  -- Indication: For Atrial flutter with rapid ventricular response    simvastatin 10 mg oral tablet  -- 1 tab(s) by mouth once a day (at bedtime)  -- Indication: For Dyslipidemia    Toprol-XL 50 mg oral tablet, extended release  -- 1 tab(s) by mouth 2 times a day  -- Indication: For Atrial flutter with rapid ventricular response    hydrocortisone 25 mg rectal suppository  -- 1 suppository(ies) rectally 2 times a day, As needed, hemorrhoids  -- Indication: For Constipation/hemorrhoids    furosemide 20 mg oral tablet  -- 1 tab(s) by mouth once a day  -- Indication: For HTN (hypertension)    docusate sodium 100 mg oral capsule  -- 1 cap(s) by mouth 3 times a day  -- Indication: For Constipation/hemorrhoids    polyethylene glycol 3350 oral powder for reconstitution  -- 17 gram(s) by mouth 2 times a day  -- Indication: For Constipation/hemorrhoids    senna oral tablet  -- 2 tab(s) by mouth once a day (at bedtime)  -- Indication: For Constipation/hemorrhoids    hydrALAZINE 50 mg oral tablet  -- 1 tab(s) by mouth 3 times a day  -- Indication: For HTN (hypertension) I will START or STAY ON the medications listed below when I get home from the hospital:    acetaminophen 325 mg oral tablet  -- 2 tab(s) by mouth every 6 hours, As needed, For Temp greater than 38 C (100.4 F)  -- Indication: For Temp (as needed)    acetaminophen 325 mg oral tablet  -- 2 tab(s) by mouth every 6 hours, As needed, Mild Pain (1 - 3)  -- Indication: For Pain (as needed)    oxyCODONE-acetaminophen 5 mg-325 mg oral tablet  -- 1 tab(s) by mouth every 4 hours, As needed, Moderate Pain (4 - 6)  -- Indication: For Pain (as needed)    oxyCODONE-acetaminophen 5 mg-325 mg oral tablet  -- 1 tab(s) by mouth every 4 hours, As needed, Severe Pain (7 - 10)  -- Indication: For Pain (as needed)    valsartan 160 mg oral tablet  -- 1 tab(s) by mouth once a day  -- Indication: For HTN (hypertension)    apixaban 5 mg oral tablet  -- 1 tab(s) by mouth every 12 hours  -- Indication: For Atrial flutter with rapid ventricular response    allopurinol 100 mg oral tablet  -- 1 tab(s) by mouth 2 times a day   -- It is very important that you take or use this exactly as directed.  Do not skip doses or discontinue unless directed by your doctor.  May cause drowsiness or dizziness.  Medication should be taken with plenty of water.    -- Indication: For Gout    simvastatin 10 mg oral tablet  -- 1 tab(s) by mouth once a day (at bedtime)  -- Indication: For Dyslipidemia    Toprol-XL 50 mg oral tablet, extended release  -- 1 tab(s) by mouth 2 times a day  -- Indication: For Atrial flutter with rapid ventricular response    hydrocortisone 25 mg rectal suppository  -- 1 suppository(ies) rectally 2 times a day, As needed, hemorrhoids  -- Indication: For Constipation/hemorrhoids    furosemide 20 mg oral tablet  -- 1 tab(s) by mouth once a day  -- Indication: For HTN (hypertension)    docusate sodium 100 mg oral capsule  -- 1 cap(s) by mouth 3 times a day  -- Indication: For Constipation/hemorrhoids    polyethylene glycol 3350 oral powder for reconstitution  -- 17 gram(s) by mouth 2 times a day  -- Indication: For Constipation/hemorrhoids    senna oral tablet  -- 2 tab(s) by mouth once a day (at bedtime)  -- Indication: For Constipation/hemorrhoids    hydrALAZINE 50 mg oral tablet  -- 1 tab(s) by mouth 3 times a day  -- Indication: For HTN (hypertension)

## 2018-04-04 NOTE — CONSULT NOTE ADULT - PROBLEM SELECTOR RECOMMENDATION 9
-LAVERNE showed:  -hgb currently stable and within normal range, no overt signs/symptoms of GI bleeding  -given above, okay from GI perspective to continue eliquis at 2.5mg PO BID for now  -continue to trend h/h   -if h/h remains stable, can likely increase eliquis to recommended dose by cardiology, will monitor  -transfuse prn   -would benefit from colonoscopy but can likely be done on outpatient basis -likely secondary to hemorrhoids given history and stable hemoglobin  -no overt signs/symptoms of GI bleeding  -no GI contraindication to anticoagulation  -continue to trend h/h  -monitor stool color  -if pt develops s/s of overt GIB will offer egd/colonoscopy -likely secondary to hemorrhoids given history and stable hemoglobin  -no overt signs/symptoms of GI bleeding  -no GI contraindication to anticoagulation  -continue to trend h/h  -monitor stool color  -if pt develops s/s of overt GIB will offer egd/colonoscopy and recommend holding of anticoagulation

## 2018-04-04 NOTE — DISCHARGE NOTE ADULT - PLAN OF CARE
Resume care @ [place] Resume care @ [place] Resume care @ [place] Resume care @ [place] Resume care @ [place] Avoid NSAID's if possible, continue w/ allopurinol

## 2018-04-04 NOTE — PHYSICAL THERAPY INITIAL EVALUATION ADULT - PERTINENT HX OF CURRENT PROBLEM, REHAB EVAL
Pt fell at home on Saturday (lowered herself to the floor) Pt's son states that she has bee drooping and falling slightly to the right .PMH includes a-fib(Eliquis) DMII, CAD,stent.  MR showed restricted diffusion in left thalamus

## 2018-04-04 NOTE — PROGRESS NOTE ADULT - PROBLEM SELECTOR PLAN 5
Allow for permissive hypertension in setting of CVA  -Continue Hydralazine, Valsartan, and Metoprolol with hold parameters

## 2018-04-04 NOTE — PHYSICAL THERAPY INITIAL EVALUATION ADULT - ADDITIONAL COMMENTS
Pt needed assistance to walk larger distances, holds son's arms. Pt own s a cane. Pt lives in a 2nd floor condo. One flight of stairs with one handrail. Son works all day, daughter is on disability for MR.

## 2018-04-04 NOTE — DISCHARGE NOTE ADULT - CARE PLAN
Principal Discharge DX:	Cerebrovascular accident (CVA), unspecified mechanism  Goal:	Resume care @ [facility]  Assessment and plan of treatment:	Resume care @ [facility]  Secondary Diagnosis:	Atrial flutter with rapid ventricular response  Goal:	Resume care @ [facility]  Assessment and plan of treatment:	Resume care @ [facility] Principal Discharge DX:	Cerebrovascular accident (CVA), unspecified mechanism  Goal:	Resume care @ [place]  Assessment and plan of treatment:	Resume care @ [facility]  Secondary Diagnosis:	Atrial flutter with rapid ventricular response  Goal:	Resume care @ [place]  Assessment and plan of treatment:	Resume care @ [facility]  Secondary Diagnosis:	Acute gout, unspecified cause, unspecified site  Goal:	Resume care @ [facility]  Assessment and plan of treatment:	Avoid NSAID's if possible, continue w/ allopurinol

## 2018-04-04 NOTE — PHYSICAL THERAPY INITIAL EVALUATION ADULT - GENERAL OBSERVATIONS, REHAB EVAL
Pt presents in semi-S[place]cer position in bed.  Telemetry intact, son and daughter present in room. Left eye droop noted. PMH includes a-fib, (on Eliquis) HTN, DM II, CAD with stent

## 2018-04-04 NOTE — PROGRESS NOTE ADULT - PROBLEM SELECTOR PLAN 1
CT head showed age indeterminate infarct at the junction of the left thalamus and   midbrain.  - monitor of tele for arrhythmia  - Follow up MRI/MRA, U/S Carotids, ECHO (TTE done but report pending)  - Follow up lipid panel, TSH, A1C  - Physical therapy evaluation  - Start Zocor 10mg per neurology  - hold ASA81 for now but will discuss further w/ cardiology  - Neuro check q4 hours  - Fall precautions  - Neuro recs appreciated CT head showed age indeterminate infarct at the junction of the left thalamus and   midbrain.  - monitor of tele for arrhythmia  - Follow up MRI/MRA, U/S Carotids, ECHO (TTE done but report pending)  - Follow up lipid panel, TSH, A1C  - Physical therapy evaluation ([facility])  - Zocor 10mg per neurology  - hold ASA81 for now but will discuss further w/ cardiology  - Neuro check q4 hours  - Fall precautions  - Neuro recs appreciated  - Spoke w/ Dr. Braun

## 2018-04-04 NOTE — DISCHARGE NOTE ADULT - MEDICATION SUMMARY - MEDICATIONS TO STOP TAKING
I will STOP taking the medications listed below when I get home from the hospital:    Eliquis 2.5 mg oral tablet  -- 1 tab(s) by mouth 2 times a day    metoprolol succinate 50 mg oral tablet, extended release  -- 1 tab(s) by mouth once a day

## 2018-04-04 NOTE — DISCHARGE NOTE ADULT - HOSPITAL COURSE
HPI:  82F with PMH A fib (on Eliquis), HTN, DMII, CAD w/ stent to LAD (2006) presents with left eye blurry vision and right sided lower extremity weakness since Saturday (4 days ago). Patient reports feeling unsteady on Saturday and "slowly went down" after standing up from her couch. Denies LOC. Denies head trauma. Was able to lift herself back up and go to bed. Son came in and noticed patient's left eye was closed and patient reported blurry vision. Son used hot compresses on left eye with minimal relief. Patient reports feeling unsteady on Sunday and leaning toward her right side. Patient "went down" on Sunday and today. Denies actually falling, reports feeling unsteady and slowly going to the floor. She is usually unable to get herself up after being on the floor and son has to help her. Per son, she rarely falls. Last fall was 1 year ago, and son assisted her in getting up at that time. Patient usually ambulates without a cane or walker, uses surroundings for support and walks slowly. Patient able to use stairs. Per son, patient saw Dr. Mtz a few months ago and had a nuclear stress test which was normal. Patient reports decreasing Eliquis to 2.5mg daily from 5mg daily because she noticed BRBPR. Of note, Eliquis bottle says 2.5mg BID; but patient reports only taking medication once a day. Patient also reports using Hydralazine BID, bottle says Hydralazine TID.     In the ED temp 97.3, , /73, RR 18, SpO2 99% RA. BUN/Cr 32/1.3. Glucose 228. Chest xray showed clear lungs. CT head showed no acute intracranial hemorrhage, mass effect, or shift of the midline structures. Age indeterminate infarct at the junction of the left thalamus and midbrain. Moderate severity microvascular disease. EKG showed a fib RVR at 115. (03 Apr 2018 16:26)      Patient admitted w/ suspected acute CVA. Neurology and cardiology evaluated the patient. Neurology confirmed midbrain CVA which is likely responsible for pt's only persistent complaint of L sided ptosis. Started on statin. Aspirin was d/c'd due to risk of GIB (current hemorrhoidal bleeding which is mild) w/ no significant change in H/H. Patient was previously nonadherent to proper regimen of eliquis. She was taking 2.5mg of eliquis once daily instead of the recommneded dose of 2.5mg bid. Patient was resumed on 5mg bid which is apporpriate dosing for atriail fibrillation to minimize stroke risk. H/H remained stable. GI was consulted and agree w/ this management plan. Patient was also continued HPI:  82F with PMH A fib (on Eliquis), HTN, DMII, CAD w/ stent to LAD (2006) presents with left eye blurry vision and right sided lower extremity weakness since Saturday (4 days ago). Patient reports feeling unsteady on Saturday and "slowly went down" after standing up from her couch. Denies LOC. Denies head trauma. Was able to lift herself back up and go to bed. Son came in and noticed patient's left eye was closed and patient reported blurry vision. Son used hot compresses on left eye with minimal relief. Patient reports feeling unsteady on Sunday and leaning toward her right side. Patient "went down" on Sunday and today. Denies actually falling, reports feeling unsteady and slowly going to the floor. She is usually unable to get herself up after being on the floor and son has to help her. Per son, she rarely falls. Last fall was 1 year ago, and son assisted her in getting up at that time. Patient usually ambulates without a cane or walker, uses surroundings for support and walks slowly. Patient able to use stairs. Per son, patient saw Dr. Mtz a few months ago and had a nuclear stress test which was normal. Patient reports decreasing Eliquis to 2.5mg daily from 5mg daily because she noticed BRBPR. Of note, Eliquis bottle says 2.5mg BID; but patient reports only taking medication once a day. Patient also reports using Hydralazine BID, bottle says Hydralazine TID.     In the ED temp 97.3, , /73, RR 18, SpO2 99% RA. BUN/Cr 32/1.3. Glucose 228. Chest xray showed clear lungs. CT head showed no acute intracranial hemorrhage, mass effect, or shift of the midline structures. Age indeterminate infarct at the junction of the left thalamus and midbrain. Moderate severity microvascular disease. EKG showed a fib RVR at 115. (03 Apr 2018 16:26)      Patient admitted w/ suspected acute CVA. Neurology and cardiology evaluated the patient. Neurology confirmed midbrain CVA which is likely responsible for pt's only persistent complaint of L sided ptosis. Started on statin. Aspirin was d/c'd due to risk of GIB (current hemorrhoidal bleeding which is mild) w/ no significant change in H/H. Patient was previously nonadherent to proper regimen of eliquis. She was taking 2.5mg of eliquis once daily instead of the recommneded dose of 2.5mg bid. Patient was resumed on 5mg bid which is apporpriate dosing for atriail fibrillation to minimize stroke risk. H/H remained stable. GI was consulted and agree w/ this management plan. Patient was previously on toprol xl 50mg q daily, this was increased to 50mg bid    The patient developed atrial flutter w/ 2:1 conduction, she responded well to IV cardizem of 5mg followed by po cardizem 30mg q 6hrs. Overnight the same evening, she developed a sinus pause >4s. Cardiology recommended holding cardizem and to continue w/ toprol. The following morning, pt developed atrial flutter again w/ uncontrolled rates >160bpm. 5mg IVP of cardizem again was recommended, rate improved to  w/ intermittent conversions of afib and atrial flutter. The patient remained hemodynamically stable and it was recommended that pt be transferred to [facility] for evaluation for placement of pacemaker for improved hemodynamic stability. Cardiology coordinated this transfer and the patient is scheduled for transfer at noon today.     On day of transfer, the pt developed discomfort to base of R great toe. Likely acute gout flare as this is similar to past exacerbations. Uric acid level was unable to be collected prior to transfer. Percocet offered moderate relief of symptoms prior to d/c. Added allopurinol to med reconciliation.     The total amount of time spent reviewing the hospital notes, laboratory values, imaging findings, assessing/counseling the patient, discussing with consultant physicians, social work, nursing staff took 70 minutes    Prior to d/c, the pt's PCP Dr. Nydia Kern was informed of plan for transfer to [facility] for PPM.

## 2018-04-04 NOTE — CONSULT NOTE ADULT - PROBLEM SELECTOR RECOMMENDATION 2
-continue with eliquis 2.5mg PO BID for now  -care as per cardiology -start bowel regimen- colace tid/senna  2tabs hs/miralax bid  -monitor bowel movements

## 2018-04-04 NOTE — PROGRESS NOTE ADULT - SUBJECTIVE AND OBJECTIVE BOX
Neurology follow up note    WILMA [id number]      Interval History:    Patient feels ok no new complaints seen with son    MEDICATIONS    apixaban 2.5 milliGRAM(s) Oral every 12 hours  dextrose 50% Injectable 12.5 Gram(s) IV Push once  dextrose 50% Injectable 25 Gram(s) IV Push once  dextrose 50% Injectable 25 Gram(s) IV Push once  dextrose Gel 1 Dose(s) Oral once PRN  docusate sodium 100 milliGRAM(s) Oral three times a day  glucagon  Injectable 1 milliGRAM(s) IntraMuscular once PRN  hydrALAZINE 50 milliGRAM(s) Oral three times a day  hydrocortisone hemorrhoidal Suppository 1 Suppository(s) Rectal two times a day PRN  insulin lispro (HumaLOG) corrective regimen sliding scale   SubCutaneous three times a day before meals  metoprolol succinate ER 50 milliGRAM(s) Oral daily  polyethylene glycol 3350 17 Gram(s) Oral two times a day  senna 2 Tablet(s) Oral at bedtime  simvastatin 10 milliGRAM(s) Oral at bedtime  valsartan 160 milliGRAM(s) Oral daily      Allergies    No Known Allergies    Intolerances            Vital Signs Last 24 Hrs  T(C): 36.8 (04 Apr 2018 12:01), Max: 36.8 (03 Apr 2018 18:49)  T(F): 98.3 (04 Apr 2018 12:01), Max: 98.3 (04 Apr 2018 04:18)  HR: 98 (04 Apr 2018 12:01) (68 - 115)  BP: 116/70 (04 Apr 2018 12:01) (116/56 - 168/82)  BP(mean): --  RR: 19 (04 Apr 2018 12:01) (18 - 19)  SpO2: 95% (04 Apr 2018 12:01) (95% - 99%)      REVIEW OF SYSTEMS:     Constitutional: No fever, chills, fatigue, weakness  Eyes: positive double vision   ENT:  No difficulty hearing, tinnitus, vertigo; No sinus or throat pain  Neck: No pain or stiffness  Respiratory: No cough, dyspnea, wheezing   Cardiovascular: No chest pain, palpitations,   Gastrointestinal: No abdominal or epigastric pain. No nausea, vomiting  No diarrhea or constipation.   Genitourinary: No dysuria, frequency, hematuria or incontinence  Neurological: No headaches, lightheadedness, vertigo, numbness or tremors  Psychiatric: No depression, anxiety, mood swings or difficulty sleeping  Musculoskeletal: No joint pain or swelling; No muscle, back or extremity pain  Skin: No itching, burning, rashes or lesions   Lymph Nodes: No enlarged glands  Endocrine: No heat or cold intolerance; No hair loss   Allergy and Immunologic: No hives or eczema    On Neurological Examination:  The patient is awake and alert.     Extraocular movements appeared to be intact.  The patient has a left eye ptosis.      The patient was complaining of double vision.      Speech was fluent.  Smile was symmetric.      Motor:  Bilateral upper were 4/5, bilateral lower were 3-/5.      Sensory:  Bilateral upper and lower intact to light touch.     Follow simple commands    GENERAL Exam: Nontoxic , No Acute Distress   	  HEENT:  normocephalic, atraumatic  		  LUNGS: Clear bilaterally    	  HEART: Normal S1S2   No murmur RRR        	  GI/ ABDOMEN:  Soft  Non tender    EXTREMITIES:   No Edema  No Clubbing  No Cyanosis No Edema    MUSCULOSKELETAL:  decreased Range of Motion all 4 extremities   	   SKIN: Normal  No Ecchymosis               LABS:  CBC Full  -  ( 04 Apr 2018 08:03 )  WBC Count : 7.2 K/uL  Hemoglobin : 13.8 g/dL  Hematocrit : 43.3 %  Platelet Count - Automated : 184 K/uL  Mean Cell Volume : 89.6 fl  Mean Cell Hemoglobin : 28.5 pg  Mean Cell Hemoglobin Concentration : 31.8 gm/dL  Auto Neutrophil # : x  Auto Lymphocyte # : x  Auto Monocyte # : x  Auto Eosinophil # : x  Auto Basophil # : x  Auto Neutrophil % : x  Auto Lymphocyte % : x  Auto Monocyte % : x  Auto Eosinophil % : x  Auto Basophil % : x      04-04    145  |  111<H>  |  26<H>  ----------------------------<  135<H>  4.6   |  25  |  1.30    Ca    8.9      04 Apr 2018 08:03      Hemoglobin A1C: Hemoglobin A1C, Whole Blood: 7.4 % (04-04 @ 10:57)    Lipid Panel 04-04 @ 10:57  Total Cholesterol, Serum 157  LDL 83  Triglycerides 115      Vitamin B12   PT/INR - ( 03 Apr 2018 11:13 )   PT: 13.3 sec;   INR: 1.22 ratio         PTT - ( 03 Apr 2018 11:13 )  PTT:28.3 sec      RADIOLOGY    ANALYSIS AND PLAN:  This is an 82-year-old with left eye ptosis, history of fall and double vision. NIH scale is 0  1.	Clinical impression could possibly be a cerebrovascular accident.  The patient does have a left eye ptosis and diplopia.  2.	I would recommend telemetry evaluation to monitor the patient's atrial fibrillation.  3.	 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	Will get Cardiology consult in regards to Eliquis dosing will need GI clearance to increase dose   5.	I will start the patient on statin.  6.	I would recommend Physical Therapy evaluation.  7.	Fall precautions.  8.	Spoke with the son at the bedside. 4/4/18  9.	neurology wise cleared any procedure needed just please do not drop SBP     Physical therapy evaluation as tolerated  OOB to chair/ambulation with assistance only if possible.    Greater than 45 minutes spent in direct patient care reviewing  the notes, lab data/ imaging , discussion with multidisciplinary team.

## 2018-04-04 NOTE — PROGRESS NOTE ADULT - PROBLEM SELECTOR PLAN 2
Patient noncompliant with anticoagulation medications.   -Continue Eliquis 2.5mg BID, monitor for signs of bleeding  -Rate controlled with Metoprolol 50mg daily. EKG showed RVR, may need to increase dose. Upon review of telemetry strips - she has been rate controlled between 70 and 90 for most of the night, this morning jumped briefly to 100bpm but otherwise has been rate controlled. Asymptomatic  -Cardio, Dr. Mtz consulted - Spoke w/ him this AM  -ECHO ordered, patient had previous echo in June. Patient nonadherent with anticoagulation medications.   -Continue Eliquis 2.5mg BID, monitor for signs of bleeding  -Rate controlled with Metoprolol 50mg daily. EKG showed RVR, may need to increase dose. Upon review of telemetry strips - she has been rate controlled between 70 and 90 for most of the night, this morning jumped briefly to 100bpm but otherwise has been rate controlled. Asymptomatic  -Cardio, Dr. Mtz consulted - Spoke w/ him this AM  -ECHO ordered, patient had previous echo in June.  - Will adjust dosing to 5mg bid in AM if H/H remains stable

## 2018-04-04 NOTE — DISCHARGE NOTE ADULT - PATIENT PORTAL LINK FT
You can access the [facility] Patient Portal, offered by [facility], by registering with the following website: [email or web address]

## 2018-04-04 NOTE — CONSULT NOTE ADULT - ASSESSMENT
82F with PMH A fib (on Eliquis), HTN, DMII, CAD w/ stent to LAD (2006) presents with left eye blurry vision and right sided lower extremity weakness since Saturday (4 days ago). Patient also with BRBPR. GI consulted for reports of hematochezia in setting of anticoagulation use.
Clinical impression is most likely  cerebrovascular accident.  What could be calculated from NIH stroke scale would be 1 .   I would recommend telemetry evaluation to rule out underlying arrhythmia.   echocardiogram was done in june   I would recommend carotid Doppler's to evaluate for carotid stenosis.  I would recommend MRI/MRA of the brain.  I would recommend to check TSH and lipid panel.  may need Eliquis dose adjustment   I will recommend cholesterol medications.  I will get Physical Therapy and Occupational Therapy.  fall precautions   spoke to son   I would recommend fall precautions.  I will continue to followup.

## 2018-04-04 NOTE — DISCHARGE NOTE ADULT - CARE PROVIDER_API CALL
Shelley Lamas), Neurology  [address] [place] [address]  Phone: [phone]  Fax: [phone] Shelley Lamas), Neurology  [address]  [address]  [place] [address]  Phone: [phone]  Fax: [phone]    Minor Mtz), Cardiovascular Disease; Internal Medicine  [address]  [place] [address]  Phone: [phone]  Fax: [phone]

## 2018-04-04 NOTE — PROGRESS NOTE ADULT - SUBJECTIVE AND OBJECTIVE BOX
Patient is a 82y old  Female who presents with a chief complaint of fell at home and double vision (03 Apr 2018 21:21)      HPI:  82F with PMH A fib (on Eliquis), HTN, DMII, CAD w/ stent to LAD (2006) presents with left eye blurry vision and right sided lower extremity weakness since Saturday (4 days ago). Patient reports feeling unsteady on Saturday and "slowly went down" after standing up from her couch. Denies LOC. Denies head trauma. Was able to lift herself back up and go to bed. Son came in and noticed patient's left eye was closed and patient reported blurry vision. Son used hot compresses on left eye with minimal relief. Patient reports feeling unsteady on Sunday and leaning toward her right side. Patient "went down" on Sunday and today. Denies actually falling, reports feeling unsteady and slowly going to the floor. She is usually unable to get herself up after being on the floor and son has to help her. Per son, she rarely falls. Last fall was 1 year ago, and son assisted her in getting up at that time. Patient usually ambulates without a cane or walker, uses surroundings for support and walks slowly. Patient able to use stairs. Per son, patient saw Dr. Mtz a few months ago and had a nuclear stress test which was normal. Patient reports decreasing Eliquis to 2.5mg daily from 5mg daily because she noticed BRBPR. Of note, Eliquis bottle says 2.5mg BID; but patient reports only taking medication once a day. Patient also reports using Hydralazine BID, bottle says Hydralazine TID.     In the ED temp 97.3, , /73, RR 18, SpO2 99% RA. BUN/Cr 32/1.3. Glucose 228. Chest xray showed clear lungs. CT head showed no acute intracranial hemorrhage, mass effect, or shift of the midline structures. Age indeterminate infarct at the junction of the left thalamus and midbrain. Moderate severity microvascular disease. EKG showed a fib RVR at 115. (03 Apr 2018 16:26)      INTERVAL HPI/OVERNIGHT EVENTS: Pt seen and evaluated at the bedside. No acute overnight events occured.       T(C): 36.6 (04-04-18 @ 08:11), Max: 36.8 (04-03-18 @ 18:49)  HR: 85 (04-04-18 @ 08:11) (68 - 115)  BP: 116/56 (04-04-18 @ 08:11) (116/56 - 168/82)  RR: 19 (04-04-18 @ 08:11) (18 - 19)  SpO2: 97% (04-04-18 @ 08:11) (97% - 99%)  Wt(kg): --  I&O's Summary      REVIEW OF SYSTEMS:  CONSTITUTIONAL: No fever, weight loss, or fatigue  EYES: No eye pain, visual disturbances, or discharge  ENMT:  No difficulty hearing, tinnitus, vertigo; No sinus or throat pain  NECK: No pain or stiffness  BREASTS: No pain, no masses,   RESPIRATORY: No cough, wheezing, chills or hemoptysis; No shortness of breath  CARDIOVASCULAR: No chest pain, palpitations, dizziness, or leg swelling  GASTROINTESTINAL: No abdominal or epigastric pain. No nausea, vomiting, or hematemesis; No diarrhea or constipation. No melena or hematochezia.  GENITOURINARY: No dysuria, frequency, hematuria, or incontinence  NEUROLOGICAL: No headaches, memory loss, loss of strength, numbness, or tremors  SKIN: No itching, burning  MUSCULOSKELETAL: No joint pain or swelling; No muscle, back, or extremity pain  PSYCHIATRIC: No recent changes in anxiety or depression    PHYSICAL EXAM:  GENERAL: NAD, well-groomed, well-developed  HEAD:  Atraumatic, Normocephalic  EYES: EOMI, PERRLA, conjunctiva and sclera clear  ENMT: No tonsillar erythema, exudates, or enlargement; Moist mucous membranes, Good dentition, No lesions  NECK: Supple, No JVD, Normal thyroid  NERVOUS SYSTEM:  Alert & Oriented X3, Good concentration; Motor Strength 5/5 B/L upper and lower extremities; DTRs 2+ intact and symmetric  CHEST/LUNG: Clear to percussion bilaterally; No rales, rhonchi, wheezing, or rubs  HEART: Regular rate and rhythm; No murmurs, rubs, or gallops  ABDOMEN: Soft, Nontender, Nondistended; Bowel sounds present  EXTREMITIES:  2+ Peripheral Pulses, No clubbing, cyanosis, or edema  LYMPH: No lymphadenopathy noted  SKIN: No rashes or lesions    MEDICATIONS  (STANDING):  ALPRAZolam 0.25 milliGRAM(s) Oral once  apixaban 2.5 milliGRAM(s) Oral every 12 hours  dextrose 50% Injectable 12.5 Gram(s) IV Push once  dextrose 50% Injectable 25 Gram(s) IV Push once  dextrose 50% Injectable 25 Gram(s) IV Push once  hydrALAZINE 50 milliGRAM(s) Oral three times a day  insulin lispro (HumaLOG) corrective regimen sliding scale   SubCutaneous three times a day before meals  metoprolol succinate ER 50 milliGRAM(s) Oral daily  simvastatin 10 milliGRAM(s) Oral at bedtime  valsartan 160 milliGRAM(s) Oral daily    MEDICATIONS  (PRN):  dextrose Gel 1 Dose(s) Oral once PRN Blood Glucose LESS THAN 70 milliGRAM(s)/deciliter  glucagon  Injectable 1 milliGRAM(s) IntraMuscular once PRN Glucose LESS THAN 70 milligrams/deciliter      LABS:                        13.8   7.2   )-----------( 184      ( 04 Apr 2018 08:03 )             43.3     04-04    145  |  111<H>  |  26<H>  ----------------------------<  135<H>  4.6   |  25  |  1.30    Ca    8.9      04 Apr 2018 08:03      PT/INR - ( 03 Apr 2018 11:13 )   PT: 13.3 sec;   INR: 1.22 ratio         PTT - ( 03 Apr 2018 11:13 )  PTT:28.3 sec    CAPILLARY BLOOD GLUCOSE      POCT Blood Glucose.: 275 mg/dL (04 Apr 2018 11:10)  POCT Blood Glucose.: 135 mg/dL (04 Apr 2018 07:41)              RADIOLOGY & ADDITIONAL TESTS:    Imaging Personally Reviewed: Patient is a 82y old  Female who presents with a chief complaint of fell at home and double vision (03 Apr 2018 21:21)      HPI:  82F with PMH A fib (on Eliquis), HTN, DMII, CAD w/ stent to LAD (2006) presents with left eye blurry vision and right sided lower extremity weakness since Saturday (4 days ago). Patient reports feeling unsteady on Saturday and "slowly went down" after standing up from her couch. Denies LOC. Denies head trauma. Was able to lift herself back up and go to bed. Son came in and noticed patient's left eye was closed and patient reported blurry vision. Son used hot compresses on left eye with minimal relief. Patient reports feeling unsteady on Sunday and leaning toward her right side. Patient "went down" on Sunday and today. Denies actually falling, reports feeling unsteady and slowly going to the floor. She is usually unable to get herself up after being on the floor and son has to help her. Per son, she rarely falls. Last fall was 1 year ago, and son assisted her in getting up at that time. Patient usually ambulates without a cane or walker, uses surroundings for support and walks slowly. Patient able to use stairs. Per son, patient saw Dr. Mtz a few months ago and had a nuclear stress test which was normal. Patient reports decreasing Eliquis to 2.5mg daily from 5mg daily because she noticed BRBPR. Of note, Eliquis bottle says 2.5mg BID; but patient reports only taking medication once a day. Patient also reports using Hydralazine BID, bottle says Hydralazine TID.     In the ED temp 97.3, , /73, RR 18, SpO2 99% RA. BUN/Cr 32/1.3. Glucose 228. Chest xray showed clear lungs. CT head showed no acute intracranial hemorrhage, mass effect, or shift of the midline structures. Age indeterminate infarct at the junction of the left thalamus and midbrain. Moderate severity microvascular disease. EKG showed a fib RVR at 115. (03 Apr 2018 16:26)      INTERVAL HPI/OVERNIGHT EVENTS: Pt seen and evaluated at the bedside. No acute overnight events occurred. Patient has persistent symptoms of L sided eyelid drooping. The patient denies slurred speech. Denies CP/SOB. Denies difficulty swallowing food, tolerated breakfast well. No other complaints. Son @ bedside who helps provide additional history.       T(C): 36.6 (04-04-18 @ 08:11), Max: 36.8 (04-03-18 @ 18:49)  HR: 85 (04-04-18 @ 08:11) (68 - 115)  BP: 116/56 (04-04-18 @ 08:11) (116/56 - 168/82)  RR: 19 (04-04-18 @ 08:11) (18 - 19)  SpO2: 97% (04-04-18 @ 08:11) (97% - 99%)  Wt(kg): --  I&O's Summary    ROS:   CONSTITUTIONAL: denies fever, chills, fatigue, weakness  HEENT: denies blurred vision, sore throat  SKIN: denies new lesions, rash  CARDIOVASCULAR: denies chest pain, chest pressure, palpitations  RESPIRATORY: denies shortness of breath, sputum production  GASTROINTESTINAL: denies nausea, vomiting, diarrhea, abdominal pain  GENITOURINARY: denies dysuria, discharge  NEUROLOGICAL: as per HPI  MUSCULOSKELETAL: denies new joint pain, muscle aches  HEMATOLOGIC: denies gross bleeding, bruising  LYMPHATICS: denies enlarged lymph nodes, extremity swelling  PSYCHIATRIC: denies recent changes in anxiety, depression  ENDOCRINOLOGIC: denies sweating, cold or heat intolerance    PE:   GENERAL: patient appears comfortable, [place], appropriate and conversational. Obvious L sided ptosis  EYES: sclera clear, no exudates  ENMT: oropharynx clear without erythema, no exudates, moist mucous membranes  NECK: supple, soft, no thyromegaly noted  LUNGS: good air entry bilaterally, clear to auscultation, symmetric breath sounds, no wheezing or rhonchi appreciated  HEART: soft S1/S2, regular rate and rhythm, no murmurs noted, no lower extremity edema  GASTROINTESTINAL: abdomen is soft, nontender, nondistended, normoactive bowel sounds, no palpable masses  INTEGUMENT: good skin turgor, no lesions noted  MUSCULOSKELETAL: no clubbing or cyanosis, no obvious deformity  NEUROLOGIC: awake, alert, oriented x3, good muscle tone in 4 extremities, motor strength in  strength, biceps, triceps, hip flexors, plantarflexors 5/5 b/l and symmetric. no sensory deficits noted. speech is clear.   PSYCHIATRIC: mood is good, affect is congruent, linear and logical thought process  HEME/LYMPH: no palpable supraclavicular nodules, no obvious ecchymosis or petechiae     MEDICATIONS  (STANDING):  ALPRAZolam 0.25 milliGRAM(s) Oral once  apixaban 2.5 milliGRAM(s) Oral every 12 hours  dextrose 50% Injectable 12.5 Gram(s) IV Push once  dextrose 50% Injectable 25 Gram(s) IV Push once  dextrose 50% Injectable 25 Gram(s) IV Push once  hydrALAZINE 50 milliGRAM(s) Oral three times a day  insulin lispro (HumaLOG) corrective regimen sliding scale   SubCutaneous three times a day before meals  metoprolol succinate ER 50 milliGRAM(s) Oral daily  simvastatin 10 milliGRAM(s) Oral at bedtime  valsartan 160 milliGRAM(s) Oral daily    MEDICATIONS  (PRN):  dextrose Gel 1 Dose(s) Oral once PRN Blood Glucose LESS THAN 70 milliGRAM(s)/deciliter  glucagon  Injectable 1 milliGRAM(s) IntraMuscular once PRN Glucose LESS THAN 70 milligrams/deciliter      LABS:                        13.8   7.2   )-----------( 184      ( 04 Apr 2018 08:03 )             43.3     04-04    145  |  111<H>  |  26<H>  ----------------------------<  135<H>  4.6   |  25  |  1.30    Ca    8.9      04 Apr 2018 08:03      PT/INR - ( 03 Apr 2018 11:13 )   PT: 13.3 sec;   INR: 1.22 ratio         PTT - ( 03 Apr 2018 11:13 )  PTT:28.3 sec    CAPILLARY BLOOD GLUCOSE      POCT Blood Glucose.: 275 mg/dL (04 Apr 2018 11:10)  POCT Blood Glucose.: 135 mg/dL (04 Apr 2018 07:41)

## 2018-04-04 NOTE — PROGRESS NOTE ADULT - SUBJECTIVE AND OBJECTIVE BOX
[facility] Cardiology    CHIEF COMPLAINT: Patient is a 82y old  Female who presents with a chief complaint of fell at home and double vision (03 Apr 2018 21:21)      Follow Up: [ ] Chest Pain      [ ] Dyspnea     [ ] Palpitations    [ ] Atrial Fibrillation     [ ] Ventricular Dysrhythmia    [ ] Abnormal EKG                      [ ] Abnormal Cardiac Enzymes     [ ] Valvular Disease    HPI:  82F with PMH A fib (on Eliquis), HTN, DMII, CAD w/ stent to LAD (2006) presents with left eye blurry vision and right sided lower extremity weakness since Saturday (4 days ago). Patient reports feeling unsteady on Saturday and "slowly went down" after standing up from her couch. Denies LOC. Denies head trauma. Was able to lift herself back up and go to bed. Son came in and noticed patient's left eye was closed and patient reported blurry vision. Son used hot compresses on left eye with minimal relief. Patient reports feeling unsteady on Sunday and leaning toward her right side. Patient "went down" on Sunday and today. Denies actually falling, reports feeling unsteady and slowly going to the floor. She is usually unable to get herself up after being on the floor and son has to help her. Per son, she rarely falls. Last fall was 1 year ago, and son assisted her in getting up at that time. Patient usually ambulates without a cane or walker, uses surroundings for support and walks slowly. Patient able to use stairs. Per son, patient saw Dr. Mtz a few months ago and had a nuclear stress test which was normal. Patient reports decreasing Eliquis to 2.5mg daily from 5mg daily because she noticed BRBPR. Of note, Eliquis bottle says 2.5mg BID; but patient reports only taking medication once a day. Patient also reports using Hydralazine BID, bottle says Hydralazine TID.     In the ED temp 97.3, , /73, RR 18, SpO2 99% RA. BUN/Cr 32/1.3. Glucose 228. Chest xray showed clear lungs. CT head showed no acute intracranial hemorrhage, mass effect, or shift of the midline structures. Age indeterminate infarct at the junction of the left thalamus and midbrain. Moderate severity microvascular disease. EKG showed a fib RVR at 115. (03 Apr 2018 16:26)    no CP or SOB    PAST MEDICAL & SURGICAL HISTORY:  HTN (hypertension)  DMII (diabetes mellitus, type 2)  Afib  H/O total hysterectomy      MEDICATIONS  (STANDING):  apixaban 2.5 milliGRAM(s) Oral every 12 hours  dextrose 50% Injectable 12.5 Gram(s) IV Push once  dextrose 50% Injectable 25 Gram(s) IV Push once  dextrose 50% Injectable 25 Gram(s) IV Push once  hydrALAZINE 50 milliGRAM(s) Oral three times a day  insulin lispro (HumaLOG) corrective regimen sliding scale   SubCutaneous three times a day before meals  metoprolol succinate ER 50 milliGRAM(s) Oral daily  simvastatin 10 milliGRAM(s) Oral at bedtime  valsartan 160 milliGRAM(s) Oral daily    MEDICATIONS  (PRN):  dextrose Gel 1 Dose(s) Oral once PRN Blood Glucose LESS THAN 70 milliGRAM(s)/deciliter  glucagon  Injectable 1 milliGRAM(s) IntraMuscular once PRN Glucose LESS THAN 70 milligrams/deciliter      Allergies    No Known Allergies    Intolerances        REVIEW OF SYSTEMS:    CONSTITUTIONAL: No weakness, fevers or chills.   EYES/ENT: No visual changes;  No vertigo or throat pain   NECK: No pain or stiffness  RESPIRATORY: No cough, wheezing, hemoptysis; No shortness of breath  CARDIOVASCULAR: No chest pain or palpitations  GASTROINTESTINAL: No abdominal or epigastric pain. No nausea, vomiting, or hematemesis; No diarrhea or constipation. No melena or hematochezia.  GENITOURINARY: No dysuria, frequency or hematuria  NEUROLOGICAL: No numbness or weakness  SKIN: No itching, burning, rashes, or lesions   All other review of systems is negative unless indicated above    Vital Signs Last 24 Hrs  T(C): 36.8 (04 Apr 2018 04:18), Max: 36.8 (03 Apr 2018 18:49)  T(F): 98.3 (04 Apr 2018 04:18), Max: 98.3 (04 Apr 2018 04:18)  HR: 77 (04 Apr 2018 04:18) (68 - 115)  BP: 156/78 (04 Apr 2018 04:18) (129/73 - 178/73)  BP(mean): --  RR: 18 (04 Apr 2018 04:18) (18 - 18)  SpO2: 98% (04 Apr 2018 04:18) (98% - 99%)    I&O's Summary      PHYSICAL EXAM:  Constitutional: NAD  Neurological: Alert and oriented  HEENT: EOMI, no JVD  Cardiovascular: S1 and S2, irr, 1/6 sys murmur  Pulmonary: breath sounds bilaterally dec at bases  Gastrointestinal: Bowel Sounds present, soft, nontender  Ext: peripheral edema, mild B/L pitting  Skin: No rashes, No cyanosis.  Psych:  Mood & affect appropriate   LABS: All Labs Reviewed:    LABS: All Labs Reviewed:                        13.2   8.8   )-----------( 160      ( 03 Apr 2018 11:13 )             41.6     04-03    144  |  112<H>  |  32<H>  ----------------------------<  228<H>  4.5   |  20<L>  |  1.30    Ca    9.4      03 Apr 2018 11:13      PT/INR - ( 03 Apr 2018 11:13 )   PT: 13.3 sec;   INR: 1.22 ratio         PTT - ( 03 Apr 2018 11:13 )  PTT:28.3 sec      Blood Culture:     82F with PMH A fib (on Eliquis), HTN, DMII remote LAD PCI >10 years ago, presents with left eye blurry vision and right sided lower extremity weakness since Saturday (4 days ago). Patient reports feeling unsteady on Saturday and "slowly went down" after standing up from her couch. Denies LOC. Denies head trauma. Was able to lift herself back up and go to bed. Son came in and noticed patient's left eye was closed and patient reported blurry vision. Son used hot compresses on left eye with minimal relief. Patient reports feeling unsteady on Sunday and leaning toward her right side. Patient "went down" on Sunday and today. Denies actually falling, reports feeling unsteady and slowly going to the floor. She is usually unable to get herself up after being on the floor and son has to help her. Per son, she rarely falls. Last fall was 1 year ago, and son assisted her in getting up at that time. Patient usually ambulates without a cane or walker, uses surroundings for support and walks slowly. Patient able to use stairs. Per son, patient saw Dr. Mtz a few months ago and had a nuclear stress test which was normal. Patient reports decreasing Eliquis to 2.5mg daily from 5mg daily because she noticed BRBPR. Of note, Eliquis bottle says 2.5mg BID; but patient reports only taking medication once a day. Patient also reports using Hydralazine BID, bottle says Hydralazine TID.     In the ED temp 97.3, , /73, RR 18, SpO2 99% RA. BUN/Cr 32/1.3. Glucose 228. Chest xray showed clear lungs. CT head showed no acute intracranial hemorrhage, mass effect, or shift of the midline structures. Age indeterminate infarct at the junction of the left thalamus and midbrain. Moderate severity microvascular disease. EKG showed a fib RVR at 115. (03 Apr 2018 16:26)    RADIOLOGY/EKG: Rapid AF, non-specific STs  2D ECHO: 6/2017: hyperdynamic EF, mod MR, mod PH  Lexiscan 7/2017: Low risk study, normal EF    Pt has only been taking Eliquis ONCE per day >3 months despite very clear instructions in my office at her last visit in December (while accompanied by her son).  REC: Resume outpt meds including Eliquis 2.5 mg PO BID for now  Eliquis was prior 5 mg but decreased to 2.5 b/c BRBPR in July 2017 which the pt was non-compliant with seeing GI.  Pt still reports blood with morning BM daily.  Would have GI see pt prior to uptitrating Eliquis to 5 BID  ASA 82 given LAD PCI if ok with neuro  Check ECHO  Would start lasix 40 PO QOD  Will follow

## 2018-04-05 DIAGNOSIS — I48.92 UNSPECIFIED ATRIAL FLUTTER: ICD-10-CM

## 2018-04-05 LAB
ANION GAP SERPL CALC-SCNC: 9 MMOL/L — SIGNIFICANT CHANGE UP (ref 5–17)
BUN SERPL-MCNC: 25 MG/DL — HIGH (ref 7–23)
CALCIUM SERPL-MCNC: 8.8 MG/DL — SIGNIFICANT CHANGE UP (ref 8.5–10.1)
CHLORIDE SERPL-SCNC: 107 MMOL/L — SIGNIFICANT CHANGE UP (ref 96–108)
CO2 SERPL-SCNC: 26 MMOL/L — SIGNIFICANT CHANGE UP (ref 22–31)
CREAT SERPL-MCNC: 1.3 MG/DL — SIGNIFICANT CHANGE UP (ref 0.5–1.3)
GLUCOSE SERPL-MCNC: 279 MG/DL — HIGH (ref 70–99)
HCT VFR BLD CALC: 41.9 % — SIGNIFICANT CHANGE UP (ref 34.5–45)
HGB BLD-MCNC: 13.5 G/DL — SIGNIFICANT CHANGE UP (ref 11.5–15.5)
MCHC RBC-ENTMCNC: 28.7 PG — SIGNIFICANT CHANGE UP (ref 27–34)
MCHC RBC-ENTMCNC: 32.2 GM/DL — SIGNIFICANT CHANGE UP (ref 32–36)
MCV RBC AUTO: 89 FL — SIGNIFICANT CHANGE UP (ref 80–100)
PLATELET # BLD AUTO: 182 K/UL — SIGNIFICANT CHANGE UP (ref 150–400)
POTASSIUM SERPL-MCNC: 4.8 MMOL/L — SIGNIFICANT CHANGE UP (ref 3.5–5.3)
POTASSIUM SERPL-SCNC: 4.8 MMOL/L — SIGNIFICANT CHANGE UP (ref 3.5–5.3)
RBC # BLD: 4.71 M/UL — SIGNIFICANT CHANGE UP (ref 3.8–5.2)
RBC # FLD: 14.2 % — SIGNIFICANT CHANGE UP (ref 10.3–14.5)
SODIUM SERPL-SCNC: 142 MMOL/L — SIGNIFICANT CHANGE UP (ref 135–145)
WBC # BLD: 7.2 K/UL — SIGNIFICANT CHANGE UP (ref 3.8–10.5)
WBC # FLD AUTO: 7.2 K/UL — SIGNIFICANT CHANGE UP (ref 3.8–10.5)

## 2018-04-05 PROCEDURE — 71045 X-RAY EXAM CHEST 1 VIEW: CPT | Mod: 26

## 2018-04-05 PROCEDURE — 99233 SBSQ HOSP IP/OBS HIGH 50: CPT

## 2018-04-05 PROCEDURE — 93010 ELECTROCARDIOGRAM REPORT: CPT

## 2018-04-05 RX ORDER — INSULIN LISPRO 100/ML
VIAL (ML) SUBCUTANEOUS
Qty: 0 | Refills: 0 | Status: DISCONTINUED | OUTPATIENT
Start: 2018-04-05 | End: 2018-04-06

## 2018-04-05 RX ORDER — ACETAMINOPHEN 500 MG
650 TABLET ORAL EVERY 6 HOURS
Qty: 0 | Refills: 0 | Status: DISCONTINUED | OUTPATIENT
Start: 2018-04-05 | End: 2018-04-06

## 2018-04-05 RX ORDER — OXYCODONE AND ACETAMINOPHEN 5; 325 MG/1; MG/1
1 TABLET ORAL EVERY 4 HOURS
Qty: 0 | Refills: 0 | Status: DISCONTINUED | OUTPATIENT
Start: 2018-04-05 | End: 2018-04-06

## 2018-04-05 RX ORDER — ALBUTEROL 90 UG/1
2.5 AEROSOL, METERED ORAL ONCE
Qty: 0 | Refills: 0 | Status: COMPLETED | OUTPATIENT
Start: 2018-04-05 | End: 2018-04-05

## 2018-04-05 RX ORDER — FUROSEMIDE 40 MG
20 TABLET ORAL DAILY
Qty: 0 | Refills: 0 | Status: DISCONTINUED | OUTPATIENT
Start: 2018-04-05 | End: 2018-04-06

## 2018-04-05 RX ORDER — METOPROLOL TARTRATE 50 MG
50 TABLET ORAL
Qty: 0 | Refills: 0 | Status: DISCONTINUED | OUTPATIENT
Start: 2018-04-05 | End: 2018-04-06

## 2018-04-05 RX ORDER — APIXABAN 2.5 MG/1
5 TABLET, FILM COATED ORAL EVERY 12 HOURS
Qty: 0 | Refills: 0 | Status: DISCONTINUED | OUTPATIENT
Start: 2018-04-05 | End: 2018-04-06

## 2018-04-05 RX ADMIN — POLYETHYLENE GLYCOL 3350 17 GRAM(S): 17 POWDER, FOR SOLUTION ORAL at 17:28

## 2018-04-05 RX ADMIN — ALBUTEROL 2.5 MILLIGRAM(S): 90 AEROSOL, METERED ORAL at 13:30

## 2018-04-05 RX ADMIN — SIMVASTATIN 10 MILLIGRAM(S): 20 TABLET, FILM COATED ORAL at 21:06

## 2018-04-05 RX ADMIN — OXYCODONE AND ACETAMINOPHEN 1 TABLET(S): 5; 325 TABLET ORAL at 21:06

## 2018-04-05 RX ADMIN — Medication 100 MILLIGRAM(S): at 06:13

## 2018-04-05 RX ADMIN — APIXABAN 5 MILLIGRAM(S): 2.5 TABLET, FILM COATED ORAL at 17:28

## 2018-04-05 RX ADMIN — SENNA PLUS 2 TABLET(S): 8.6 TABLET ORAL at 21:06

## 2018-04-05 RX ADMIN — Medication 10 MILLIGRAM(S): at 10:58

## 2018-04-05 RX ADMIN — Medication 50 MILLIGRAM(S): at 17:28

## 2018-04-05 RX ADMIN — Medication 50 MILLIGRAM(S): at 21:06

## 2018-04-05 RX ADMIN — POLYETHYLENE GLYCOL 3350 17 GRAM(S): 17 POWDER, FOR SOLUTION ORAL at 06:13

## 2018-04-05 RX ADMIN — APIXABAN 2.5 MILLIGRAM(S): 2.5 TABLET, FILM COATED ORAL at 06:13

## 2018-04-05 RX ADMIN — Medication 2: at 17:27

## 2018-04-05 RX ADMIN — Medication 100 MILLIGRAM(S): at 14:41

## 2018-04-05 RX ADMIN — Medication 3: at 11:30

## 2018-04-05 RX ADMIN — Medication 20 MILLIGRAM(S): at 11:30

## 2018-04-05 RX ADMIN — Medication 100 MILLIGRAM(S): at 21:06

## 2018-04-05 RX ADMIN — OXYCODONE AND ACETAMINOPHEN 1 TABLET(S): 5; 325 TABLET ORAL at 14:49

## 2018-04-05 RX ADMIN — OXYCODONE AND ACETAMINOPHEN 1 TABLET(S): 5; 325 TABLET ORAL at 15:44

## 2018-04-05 NOTE — PROGRESS NOTE ADULT - ATTENDING COMMENTS
Advanced care planning was discussed with patient and family.  Advanced care planning forms were reviewed and discussed.  Risks, benefits and alternatives of gastroenterologic procedures were discussed in detail and all questions were answered.    25 minutes spent.
The tx plan was discussed with the patient in detail. The patient's questions and concerns were addressed to the best of my ability. The patient is in agreement with the plan detailed above. The patient demonstrated adequate understanding of the plan and the counseling which I have provided.
The tx plan was discussed with the patient in detail. The patient's questions and concerns were addressed to the best of my ability. The patient is in agreement with the plan detailed above. The patient demonstrated adequate understanding of the plan and the counseling which I have provided.

## 2018-04-05 NOTE — PROGRESS NOTE ADULT - SUBJECTIVE AND OBJECTIVE BOX
Patient is a 82y old  Female who presents with a chief complaint of fell at home and double vision (04 Apr 2018 14:20)      HPI:  82F with PMH A fib (on Eliquis), HTN, DMII, CAD w/ stent to LAD (2006) presents with left eye blurry vision and right sided lower extremity weakness since Saturday (4 days ago). Patient reports feeling unsteady on Saturday and "slowly went down" after standing up from her couch. Denies LOC. Denies head trauma. Was able to lift herself back up and go to bed. Son came in and noticed patient's left eye was closed and patient reported blurry vision. Son used hot compresses on left eye with minimal relief. Patient reports feeling unsteady on Sunday and leaning toward her right side. Patient "went down" on Sunday and today. Denies actually falling, reports feeling unsteady and slowly going to the floor. She is usually unable to get herself up after being on the floor and son has to help her. Per son, she rarely falls. Last fall was 1 year ago, and son assisted her in getting up at that time. Patient usually ambulates without a cane or walker, uses surroundings for support and walks slowly. Patient able to use stairs. Per son, patient saw Dr. Mtz a few months ago and had a nuclear stress test which was normal. Patient reports decreasing Eliquis to 2.5mg daily from 5mg daily because she noticed BRBPR. Of note, Eliquis bottle says 2.5mg BID; but patient reports only taking medication once a day. Patient also reports using Hydralazine BID, bottle says Hydralazine TID.     In the ED temp 97.3, , /73, RR 18, SpO2 99% RA. BUN/Cr 32/1.3. Glucose 228. Chest xray showed clear lungs. CT head showed no acute intracranial hemorrhage, mass effect, or shift of the midline structures. Age indeterminate infarct at the junction of the left thalamus and midbrain. Moderate severity microvascular disease. EKG showed a fib RVR at 115. (03 Apr 2018 16:26)      INTERVAL HPI/OVERNIGHT EVENTS: Pt seen and evaluated at the bedside. No acute overnight events occured. Pt experienced mild coughing this afternoon, CXR was clear, EKG is pending. Patient also experienced atrial flutter w/ RVR. Rates increased to 160's but were nonsustained initially. Then within half hour, rates persisted into the 30's as per telemetry technician and as per RN @ bedside. Pt denies CP/SOB. Pt admits pain in her L foot near chronic bunion approximately 8/10, nonradiating and throbbing. Upon further questioning pt admits mild palpitations but still denies CP or SOB. No other complaints. Pt hasn't been out of bed. Still has persistent eyelid drooping but denies overt weakness, numbness or tingling.       T(C): 36.6 (04-05-18 @ 11:30), Max: 37.3 (04-04-18 @ 15:53)  HR: 59 (04-05-18 @ 13:30) (59 - 94)  BP: 149/70 (04-05-18 @ 11:30) (120/80 - 166/89)  RR: 24 (04-05-18 @ 11:30) (18 - 24)  SpO2: 100% (04-05-18 @ 13:30) (98% - 100%)  Wt(kg): --  I&O's Summary      ROS:   CONSTITUTIONAL: denies fever, chills, fatigue, weakness  HEENT: denies blurred vision, sore throat  SKIN: denies new lesions, rash  CARDIOVASCULAR: denies chest pain, chest pressure. as per hpi  RESPIRATORY: denies shortness of breath, sputum production. as per hpi  GASTROINTESTINAL: denies nausea, vomiting, diarrhea, abdominal pain  GENITOURINARY: denies dysuria, discharge  NEUROLOGICAL: as per HPI  MUSCULOSKELETAL: denies new joint pain, muscle aches  HEMATOLOGIC: denies gross bleeding, bruising  LYMPHATICS: denies enlarged lymph nodes, extremity swelling  PSYCHIATRIC: denies recent changes in anxiety, depression  ENDOCRINOLOGIC: denies sweating, cold or heat intolerance    PE:   GENERAL: patient appears comfortable, [place], appropriate and conversational. Obvious L sided ptosis  EYES: sclera clear, no exudates  ENMT: oropharynx clear without erythema, no exudates, moist mucous membranes  NECK: supple, soft, no thyromegaly noted  LUNGS: good air entry bilaterally, clear to auscultation, symmetric breath sounds, no wheezing or rhonchi appreciated  HEART: soft S1/S2, regular rate and rhythm, no murmurs noted, no lower extremity edema  GASTROINTESTINAL: abdomen is soft, nontender, nondistended, normoactive bowel sounds, no palpable masses  INTEGUMENT: good skin turgor, no lesions noted  MUSCULOSKELETAL: no clubbing or cyanosis, no obvious deformity  NEUROLOGIC: awake, alert, oriented x3, good muscle tone in 4 extremities, motor strength in  strength, biceps, triceps, hip flexors, plantarflexors 5/5 b/l and symmetric. no sensory deficits noted. speech is clear.   PSYCHIATRIC: mood is good, affect is congruent, linear and logical thought process  HEME/LYMPH: no palpable supraclavicular nodules, no obvious ecchymosis or petechiae      MEDICATIONS  (STANDING):  apixaban 5 milliGRAM(s) Oral every 12 hours  dextrose 50% Injectable 12.5 Gram(s) IV Push once  dextrose 50% Injectable 25 Gram(s) IV Push once  dextrose 50% Injectable 25 Gram(s) IV Push once  diltiazem    Tablet 30 milliGRAM(s) Oral every 6 hours  diltiazem Injectable 5 milliGRAM(s) IV Push once  docusate sodium 100 milliGRAM(s) Oral three times a day  furosemide    Tablet 20 milliGRAM(s) Oral daily  hydrALAZINE 50 milliGRAM(s) Oral three times a day  insulin lispro (HumaLOG) corrective regimen sliding scale   SubCutaneous three times a day before meals  metoprolol succinate ER 50 milliGRAM(s) Oral daily  polyethylene glycol 3350 17 Gram(s) Oral two times a day  senna 2 Tablet(s) Oral at bedtime  simvastatin 10 milliGRAM(s) Oral at bedtime  valsartan 160 milliGRAM(s) Oral daily    MEDICATIONS  (PRN):  acetaminophen   Tablet 650 milliGRAM(s) Oral every 6 hours PRN For Temp greater than 38 C (100.4 F)  acetaminophen   Tablet. 650 milliGRAM(s) Oral every 6 hours PRN Mild Pain (1 - 3)  dextrose Gel 1 Dose(s) Oral once PRN Blood Glucose LESS THAN 70 milliGRAM(s)/deciliter  glucagon  Injectable 1 milliGRAM(s) IntraMuscular once PRN Glucose LESS THAN 70 milligrams/deciliter  hydrocortisone hemorrhoidal Suppository 1 Suppository(s) Rectal two times a day PRN hemorrhoids  oxyCODONE    5 mG/acetaminophen 325 mG 1 Tablet(s) Oral every 4 hours PRN Moderate Pain (4 - 6)  oxyCODONE    5 mG/acetaminophen 325 mG 1 Tablet(s) Oral every 4 hours PRN Severe Pain (7 - 10)      LABS:                        13.5   7.2   )-----------( 182      ( 05 Apr 2018 09:36 )             41.9     04-05    142  |  107  |  25<H>  ----------------------------<  279<H>  4.8   |  26  |  1.30    Ca    8.8      05 Apr 2018 09:36          CAPILLARY BLOOD GLUCOSE      POCT Blood Glucose.: 254 mg/dL (05 Apr 2018 11:26)  POCT Blood Glucose.: 147 mg/dL (05 Apr 2018 07:46)  POCT Blood Glucose.: 174 mg/dL (04 Apr 2018 21:09)  POCT Blood Glucose.: 163 mg/dL (04 Apr 2018 17:34)

## 2018-04-05 NOTE — PROGRESS NOTE ADULT - PROBLEM SELECTOR PLAN 2
-continue colace tid/senna  2 tabs hs/miralax bid  -add dulcolax suppository x 1  -monitor bowel movements

## 2018-04-05 NOTE — DIETITIAN INITIAL EVALUATION ADULT. - NS AS NUTRI INTERV MEALS SNACK
General/healthful diet/Recommend change diet to consistent CHO DASH/TLC/Carbohydrate - modified diet

## 2018-04-05 NOTE — DIETITIAN INITIAL EVALUATION ADULT. - OTHER INFO
Pt on BiPAP and declines interview at this time. Per EMR: pt admitted with CVA and PMH of HTN, T2DM, Afib; POCT (4/3-4/5): 135-279mg/dL; last BM 4/5; 75% intake; norma 18 skin intact no edema. Recommend change diet to consistent carbohydrate DASH/TLC.

## 2018-04-05 NOTE — PROGRESS NOTE ADULT - PROBLEM SELECTOR PLAN 5
-Follow up A1C  -Low dose sliding scale with hypoglycemia protocol  -Accuchecks -Follow up A1C  -increased HISS due to uncontrolled DM2, adjusted diet to carb controlled diet  -Accuchecks

## 2018-04-05 NOTE — DIETITIAN INITIAL EVALUATION ADULT. - PROBLEM SELECTOR PLAN 3
d/w neuro and rec to hold ASA for now.  No role for plavix as stent was >1 yr ago.   c/w beta-blocker and statin therapy

## 2018-04-05 NOTE — PROGRESS NOTE ADULT - PROBLEM SELECTOR PLAN 1
-likely secondary to hemorrhoids given history and stable hemoglobin  -no overt signs/symptoms of GI bleeding  -no GI contraindication to anticoagulation  -continue to trend h/h, stable today  -monitor stool color  -if pt develops s/s of overt GIB will offer egd/colonoscopy and recommend holding of anticoagulation

## 2018-04-05 NOTE — PROGRESS NOTE ADULT - SUBJECTIVE AND OBJECTIVE BOX
Neurology Follow up note    WILMA [id number]    HPI:  82F with PMH A fib (on Eliquis), HTN, DMII, CAD w/ stent to LAD (2006) presents with left eye blurry vision and right sided lower extremity weakness since Saturday (4 days ago). Patient reports feeling unsteady on Saturday and "slowly went down" after standing up from her couch. Denies LOC. Denies head trauma. Was able to lift herself back up and go to bed. Son came in and noticed patient's left eye was closed and patient reported blurry vision. Son used hot compresses on left eye with minimal relief. Patient reports feeling unsteady on Sunday and leaning toward her right side. Patient "went down" on Sunday and today. Denies actually falling, reports feeling unsteady and slowly going to the floor. She is usually unable to get herself up after being on the floor and son has to help her. Per son, she rarely falls. Last fall was 1 year ago, and son assisted her in getting up at that time. Patient usually ambulates without a cane or walker, uses surroundings for support and walks slowly. Patient able to use stairs. Per son, patient saw Dr. Mtz a few months ago and had a nuclear stress test which was normal. Patient reports decreasing Eliquis to 2.5mg daily from 5mg daily because she noticed BRBPR. Of note, Eliquis bottle says 2.5mg BID; but patient reports only taking medication once a day. Patient also reports using Hydralazine BID, bottle says Hydralazine TID.     In the ED temp 97.3, , /73, RR 18, SpO2 99% RA. BUN/Cr 32/1.3. Glucose 228. Chest xray showed clear lungs. CT head showed no acute intracranial hemorrhage, mass effect, or shift of the midline structures. Age indeterminate infarct at the junction of the left thalamus and midbrain. Moderate severity microvascular disease. EKG showed a fib RVR at 115. (03 Apr 2018 16:26)      Interval History - doing well.    Patient is seen, chart was reviewed and case was discussed with the treatment team.  Pt is not in any distress.   Lying on bed comfortably.   No events reported overnight.   No clinical seizure was reported.  Sitting on chair bed comfortably.    is at bedside.    Vital Signs Last 24 Hrs  T(C): 36.3 (05 Apr 2018 15:59), Max: 37.1 (04 Apr 2018 20:17)  T(F): 97.4 (05 Apr 2018 15:59), Max: 98.7 (04 Apr 2018 20:17)  HR: 100 (05 Apr 2018 15:59) (59 - 100)  BP: 142/74 (05 Apr 2018 15:59) (120/80 - 156/84)  BP(mean): --  RR: 18 (05 Apr 2018 15:59) (18 - 24)  SpO2: 99% (05 Apr 2018 15:59) (98% - 100%)        REVIEW OF SYSTEMS:    Constitutional: No fever, weight loss or fatigue  Eyes: No eye pain, visual disturbances, or discharge  ENT:  No difficulty hearing, tinnitus, vertigo; No sinus or throat pain  Neck: No pain or stiffness  Respiratory: No cough, wheezing, chills or hemoptysis  Cardiovascular: No chest pain, palpitations, shortness of breath, dizziness or leg swelling  Gastrointestinal: No abdominal or epigastric pain. No nausea, vomiting or hematemesis; No diarrhea or constipation. No melena or hematochezia.  Genitourinary: No dysuria, frequency, hematuria or incontinence  Neurological: No headaches, memory loss, loss of strength, numbness or tremors  Psychiatric: No depression, anxiety, mood swings or difficulty sleeping  Musculoskeletal: No joint pain or swelling; No muscle, back or extremity pain  Skin: No itching, burning, rashes or lesions   Lymph Nodes: No enlarged glands  Endocrine: No heat or cold intolerance; No hair loss, No h/o diabetes or thyroid dysfunction  Allergy and Immunologic: No hives or eczema    On Neurological Examination:    Mental Status - Pt is alert, awake, oriented X3.    Speech -  dysarthria.    Cranial Nerves - left ptosis.  Pt has no facial asymmetry. Facial sensation is intact.    Muscle tone - is normal all over.     Motor Exam - 5/5 of UE.  LE 4/5.    Sensory Exam - . Pt withdraws all extremities equally on stimulation. No asymmetry seen. No complaints of tingling, numbness.      coordination:    Finger to nose: normal.    Deep tendon Reflexes - 2 plus all over.         Neck Supple -  Yes.     MEDICATIONS    acetaminophen   Tablet 650 milliGRAM(s) Oral every 6 hours PRN  acetaminophen   Tablet. 650 milliGRAM(s) Oral every 6 hours PRN  apixaban 5 milliGRAM(s) Oral every 12 hours  dextrose 50% Injectable 12.5 Gram(s) IV Push once  dextrose 50% Injectable 25 Gram(s) IV Push once  dextrose 50% Injectable 25 Gram(s) IV Push once  dextrose Gel 1 Dose(s) Oral once PRN  diltiazem    Tablet 30 milliGRAM(s) Oral every 6 hours  docusate sodium 100 milliGRAM(s) Oral three times a day  furosemide    Tablet 20 milliGRAM(s) Oral daily  glucagon  Injectable 1 milliGRAM(s) IntraMuscular once PRN  hydrALAZINE 50 milliGRAM(s) Oral three times a day  hydrocortisone hemorrhoidal Suppository 1 Suppository(s) Rectal two times a day PRN  insulin lispro (HumaLOG) corrective regimen sliding scale   SubCutaneous three times a day before meals  metoprolol succinate ER 50 milliGRAM(s) Oral two times a day  oxyCODONE    5 mG/acetaminophen 325 mG 1 Tablet(s) Oral every 4 hours PRN  oxyCODONE    5 mG/acetaminophen 325 mG 1 Tablet(s) Oral every 4 hours PRN  polyethylene glycol 3350 17 Gram(s) Oral two times a day  senna 2 Tablet(s) Oral at bedtime  simvastatin 10 milliGRAM(s) Oral at bedtime  valsartan 160 milliGRAM(s) Oral daily      Allergies    No Known Allergies    Intolerances        LABS:  CBC Full  -  ( 05 Apr 2018 09:36 )  WBC Count : 7.2 K/uL  Hemoglobin : 13.5 g/dL  Hematocrit : 41.9 %  Platelet Count - Automated : 182 K/uL  Mean Cell Volume : 89.0 fl  Mean Cell Hemoglobin : 28.7 pg  Mean Cell Hemoglobin Concentration : 32.2 gm/dL        04-05    142  |  107  |  25<H>  ----------------------------<  279<H>  4.8   |  26  |  1.30    Ca    8.8      05 Apr 2018 09:36      Hemoglobin A1C:     Vitamin B12     RADIOLOGY    ASSESSMENT AND PLAN:      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    ON ELIQUIS.  NEURO WISE STABLE FOR REHAB.  Physical therapy evaluation.  OOB to chair/ambulation with assistance only.  Advanced care planning was discussed with family.  Pain is accessed and addressed.  Plan of care was discussed with family. Questions answered.  Would continue to follow.

## 2018-04-05 NOTE — DIETITIAN INITIAL EVALUATION ADULT. - PROBLEM SELECTOR PLAN 1
CT head showed age indeterminate infarct at the junction of the left thalamus and   midbrain.  -Admit to telemetry, monitor of tele for arrhythmia  -Follow up MRI/MRA, U/S Carotids, ECHO  -Follow up lipid panel, TSH, A1C  -Physical therapy evaluation  -Start Zocor 10mg per neurology  -Start ASA81 daily  -Neuro check q4 hours  -Fall precautions  -Neuro recs appreciated

## 2018-04-05 NOTE — PROGRESS NOTE ADULT - SUBJECTIVE AND OBJECTIVE BOX
[facility] Cardiology    CHIEF COMPLAINT: Patient is a 82y old  Female who presents with a chief complaint of fell at home and double vision (04 Apr 2018 14:20)      Follow Up: [ ] Chest Pain      [ ] Dyspnea     [ ] Palpitations    [ ] Atrial Fibrillation     [ ] Ventricular Dysrhythmia    [ ] Abnormal EKG                      [ ] Abnormal Cardiac Enzymes     [ ] Valvular Disease    HPI:  82F with PMH A fib (on Eliquis), HTN, DMII, CAD w/ stent to LAD (2006) presents with left eye blurry vision and right sided lower extremity weakness since Saturday (4 days ago). Patient reports feeling unsteady on Saturday and "slowly went down" after standing up from her couch. Denies LOC. Denies head trauma. Was able to lift herself back up and go to bed. Son came in and noticed patient's left eye was closed and patient reported blurry vision. Son used hot compresses on left eye with minimal relief. Patient reports feeling unsteady on Sunday and leaning toward her right side. Patient "went down" on Sunday and today. Denies actually falling, reports feeling unsteady and slowly going to the floor. She is usually unable to get herself up after being on the floor and son has to help her. Per son, she rarely falls. Last fall was 1 year ago, and son assisted her in getting up at that time. Patient usually ambulates without a cane or walker, uses surroundings for support and walks slowly. Patient able to use stairs. Per son, patient saw Dr. Mtz a few months ago and had a nuclear stress test which was normal. Patient reports decreasing Eliquis to 2.5mg daily from 5mg daily because she noticed BRBPR. Of note, Eliquis bottle says 2.5mg BID; but patient reports only taking medication once a day. Patient also reports using Hydralazine BID, bottle says Hydralazine TID.     In the ED temp 97.3, , /73, RR 18, SpO2 99% RA. BUN/Cr 32/1.3. Glucose 228. Chest xray showed clear lungs. CT head showed no acute intracranial hemorrhage, mass effect, or shift of the midline structures. Age indeterminate infarct at the junction of the left thalamus and midbrain. Moderate severity microvascular disease. EKG showed a fib RVR at 115. (03 Apr 2018 16:26)    no CP or SOB    PAST MEDICAL & SURGICAL HISTORY:  HTN (hypertension)  DMII (diabetes mellitus, type 2)  Afib  H/O total hysterectomy      MEDICATIONS  (STANDING):  apixaban 2.5 milliGRAM(s) Oral every 12 hours  dextrose 50% Injectable 12.5 Gram(s) IV Push once  dextrose 50% Injectable 25 Gram(s) IV Push once  dextrose 50% Injectable 25 Gram(s) IV Push once  docusate sodium 100 milliGRAM(s) Oral three times a day  hydrALAZINE 50 milliGRAM(s) Oral three times a day  insulin lispro (HumaLOG) corrective regimen sliding scale   SubCutaneous three times a day before meals  metoprolol succinate ER 50 milliGRAM(s) Oral daily  polyethylene glycol 3350 17 Gram(s) Oral two times a day  senna 2 Tablet(s) Oral at bedtime  simvastatin 10 milliGRAM(s) Oral at bedtime  valsartan 160 milliGRAM(s) Oral daily    MEDICATIONS  (PRN):  dextrose Gel 1 Dose(s) Oral once PRN Blood Glucose LESS THAN 70 milliGRAM(s)/deciliter  glucagon  Injectable 1 milliGRAM(s) IntraMuscular once PRN Glucose LESS THAN 70 milligrams/deciliter  hydrocortisone hemorrhoidal Suppository 1 Suppository(s) Rectal two times a day PRN hemorrhoids      Allergies    No Known Allergies    Intolerances        REVIEW OF SYSTEMS:    CONSTITUTIONAL: No weakness, fevers or chills.   EYES/ENT: No visual changes;  No vertigo or throat pain   NECK: No pain or stiffness  RESPIRATORY: No cough, wheezing, hemoptysis; No shortness of breath  CARDIOVASCULAR: No chest pain or palpitations  GASTROINTESTINAL: No abdominal or epigastric pain. No nausea, vomiting, or hematemesis; No diarrhea or constipation. No melena or hematochezia.  GENITOURINARY: No dysuria, frequency or hematuria  NEUROLOGICAL: No numbness or weakness  SKIN: No itching, burning, rashes, or lesions   All other review of systems is negative unless indicated above    Vital Signs Last 24 Hrs  T(C): 36.4 (05 Apr 2018 08:00), Max: 37.3 (04 Apr 2018 15:53)  T(F): 97.5 (05 Apr 2018 08:00), Max: 99.1 (04 Apr 2018 15:53)  HR: 81 (05 Apr 2018 08:00) (77 - 98)  BP: 148/77 (05 Apr 2018 08:00) (116/70 - 166/89)  BP(mean): --  RR: 18 (05 Apr 2018 08:00) (18 - 19)  SpO2: 99% (05 Apr 2018 08:00) (95% - 99%)    I&O's Summary      PHYSICAL EXAM:  Constitutional: NAD  Neurological: Alert and oriented  HEENT: EOMI, no JVD  Cardiovascular: S1 and S2, irr, 1/6 sys murmur  Pulmonary: breath sounds bilaterally dec at bases  Gastrointestinal: Bowel Sounds present, soft, nontender  Ext: peripheral edema, mild B/L pitting  Skin: No rashes, No cyanosis.  Psych:  Mood & affect appropriate   LABS: All Labs Reviewed:    LABS: All Labs Reviewed:                        13.5   7.2   )-----------( 182      ( 05 Apr 2018 09:36 )            41.9     04-05    142  |  107  |  25<H>  ----------------------------<  279<H>  4.8   |  26  |  1.30  Ca    8.8      05 Apr 2018 09:36  PT/INR - ( 03 Apr 2018 11:13 )   PT: 13.3 sec;   INR: 1.22 ratio     PTT - ( 03 Apr 2018 11:13 )  PTT:28.3 sec    04-04 @ 08:03  TSH: 2.12  82F with PMH A fib (on Eliquis), HTN, DMII remote LAD PCI >10 years ago, presents with left eye blurry vision, right sided lower extremity weakness, CVA.    In the ED temp 97.3, , /73, RR 18, SpO2 99% RA. BUN/Cr 32/1.3. Glucose 228. Chest xray showed clear lungs. CT head showed no acute intracranial hemorrhage, mass effect, or shift of the midline structures. Age indeterminate infarct at the junction of the left thalamus and midbrain. Moderate severity microvascular disease. EKG showed a fib RVR at 115. (03 Apr 2018 16:26)    RADIOLOGY/EKG: Rapid AF, non-specific STs  2D ECHO: 6/2017: hyperdynamic EF, mod MR, mod PH  Lexiscan 7/2017: Low risk study, normal EF    Pt was only taking Eliquis ONCE per day >3 months despite very clear instructions in my office at her last visit in December (while accompanied by her son).    Eliquis was prior 5 mg but decreased to 2.5 b/c BRBPR in July 2017 which the pt was non-compliant with seeing GI.  Cont Toprol XL 50 for rate control    Will uptitrate Eliquis to 5 BID. (Already cleared by GI, likely hemmorhoidal bleeding only)  ASA 81 on hold per neuro  ECHO here: Hyperdynamic LV with EF 65-70%, no significant aortic stenosis or   regurgitation, mild MR, mild TR, suggestion of elevated LVEDP by tissue   Doppler index, RVSP 54 mmHg. Cannot rule out small pericardial effusion.    Will start lasix 40 PO QOD  Will follow

## 2018-04-05 NOTE — CHART NOTE - NSCHARTNOTEFT_GEN_A_CORE
Patient w/ atrial flutter w/ 2:1 conduction and HR >150bpm, admits palpitations.  A/P: 5mg IV cardizem, 30mg po cardizem, reviewed TTE, reviewed EKG, SBP>140    Please see daily progress note for additional details regarding tx plan

## 2018-04-05 NOTE — CHART NOTE - NSCHARTNOTEFT_GEN_A_CORE
Called by RN, patient had a 4.1 second pause on tele monitor. Patient asymptomatic, no complaints. Patient was assessed at bedside, family present, patient denies feeling any chest pain, palpitation, SOB, dizziness, or other symptoms. Vitals stable, patient currently in afib, but rate controlled.    Vital Signs Last 24 Hrs  T(C): 36.8 (05 Apr 2018 20:04), Max: 37.1 (04 Apr 2018 20:17)  T(F): 98.2 (05 Apr 2018 20:04), Max: 98.7 (04 Apr 2018 20:17)  HR: 87 (05 Apr 2018 20:04) (59 - 100)  BP: 169/82 (05 Apr 2018 20:04) (120/80 - 169/82)  BP(mean): --  RR: 19 (05 Apr 2018 20:04) (18 - 24)  SpO2: 97% (05 Apr 2018 20:04) (95% - 100%)    Physical Exam:  General: NAD, obese, appears comfortable   HEENT: NCAT  Neck: Supple, no JVD  Neurology: awake and alert, answers all question appropriately   Respiratory: CTA B/L  CV: irregular rhythm, S1/S2  Abdominal: Soft, NT, ND +BSx4  Extremities: +b/l LE edema   Skin: warm, dry    A/P: 82F with PMH A fib (on Eliquis), HTN, DMII presents with left eye blurry vision and right sided lower extremity weakness admitted for CVA. Called by RN for a 4.1 second pause on tele monitor    - patient asymptomatic at bedside, no acute distress, on room air   - vitals are stable, in afib, rate controlled, patient received cardizem, metoprolol and eliquis around 5pm today  - f/u bedside EKG to evaluate for any new arrythmia or heart blocks  - Dr. Mtz informed of patient, recommended discontinuing cardizem, will keep patient on metoprolol BID   - f/u AM labs  - will follow, RN to call if any changes Called by RN, patient had a 4.1 second pause on tele monitor. Patient asymptomatic, no complaints. Patient was assessed at bedside, family present, patient denies feeling any chest pain, palpitation, SOB, dizziness, or other symptoms. Vitals stable, patient currently in afib, but rate controlled.    Vital Signs Last 24 Hrs  T(C): 36.8 (05 Apr 2018 20:04), Max: 37.1 (04 Apr 2018 20:17)  T(F): 98.2 (05 Apr 2018 20:04), Max: 98.7 (04 Apr 2018 20:17)  HR: 87 (05 Apr 2018 20:04) (59 - 100)  BP: 169/82 (05 Apr 2018 20:04) (120/80 - 169/82)  BP(mean): --  RR: 19 (05 Apr 2018 20:04) (18 - 24)  SpO2: 97% (05 Apr 2018 20:04) (95% - 100%)    Physical Exam:  General: NAD, obese, appears comfortable   HEENT: NCAT  Neck: Supple, no JVD  Neurology: awake and alert, answers all question appropriately   Respiratory: CTA B/L  CV: irregular rhythm, S1/S2  Abdominal: Soft, NT, ND +BSx4  Extremities: +b/l LE edema   Skin: warm, dry    A/P: 82F with PMH A fib (on Eliquis), HTN, DMII presents with left eye blurry vision and right sided lower extremity weakness admitted for CVA. Called by RN for a 4.1 second pause on tele monitor    - patient asymptomatic at bedside, no acute distress, on room air   - vitals are stable, in afib, rate controlled, patient received cardizem, metoprolol and eliquis around 5pm today  - bedside EKG showed aflutter at rate of 74bpm.   - Dr. Mtz informed of patient, recommended discontinuing cardizem, will keep patient on metoprolol BID   - f/u AM labs  - will follow, RN to call if any changes

## 2018-04-05 NOTE — PROGRESS NOTE ADULT - PROBLEM SELECTOR PLAN 2
on toprol xl 50mg  add 5mg IVP of cardizem and add 30mg po cardizem q 6hrs and monitor on tele  EKG shows a flutter w/ variable conduction 3:1 and 4:1 with rates between 110 and 130's, will monitor closely and hold hydralazine at this time  - spoke w/ cardiology and agrees w/ current tx plan

## 2018-04-05 NOTE — PROGRESS NOTE ADULT - PROBLEM SELECTOR PLAN 1
CT head showed age indeterminate infarct at the junction of the left thalamus and   midbrain.  - monitor of tele for arrhythmia  - MRI: Abnormal T2 prolongation with restricted diffusion is seen ; involving the left thalamus as described above ; Unremarkable MRA of the [place].  - TTE: Hyperdynamic LV with EF 65-70%, no significant aortic stenosis or regurgitation, mild MR, mild TR, suggestion of elevated LVEDP by tissue Doppler index, RVSP 54 mmHg. Cannot rule out small pericardial effusion.  - Carotid U/S: No significant stenosis extracranial carotids bilaterally.  - Physical therapy evaluation (SIXTO)  - Zocor 10mg per neurology - - Reviewed lipid panel   - hold ASA81 for now   - Neuro checks, nursing protocols, aspiration/fall precautions  - Fall precautions  - Spoke w/ Dr. Braun

## 2018-04-06 VITALS
SYSTOLIC BLOOD PRESSURE: 162 MMHG | RESPIRATION RATE: 18 BRPM | HEART RATE: 104 BPM | OXYGEN SATURATION: 99 % | TEMPERATURE: 99 F | DIASTOLIC BLOOD PRESSURE: 78 MMHG

## 2018-04-06 LAB
ANION GAP SERPL CALC-SCNC: 9 MMOL/L — SIGNIFICANT CHANGE UP (ref 5–17)
BUN SERPL-MCNC: 33 MG/DL — HIGH (ref 7–23)
CALCIUM SERPL-MCNC: 8.5 MG/DL — SIGNIFICANT CHANGE UP (ref 8.5–10.1)
CHLORIDE SERPL-SCNC: 108 MMOL/L — SIGNIFICANT CHANGE UP (ref 96–108)
CO2 SERPL-SCNC: 25 MMOL/L — SIGNIFICANT CHANGE UP (ref 22–31)
CREAT SERPL-MCNC: 1.3 MG/DL — SIGNIFICANT CHANGE UP (ref 0.5–1.3)
GLUCOSE SERPL-MCNC: 194 MG/DL — HIGH (ref 70–99)
HCT VFR BLD CALC: 38.2 % — SIGNIFICANT CHANGE UP (ref 34.5–45)
HGB BLD-MCNC: 12.4 G/DL — SIGNIFICANT CHANGE UP (ref 11.5–15.5)
MAGNESIUM SERPL-MCNC: 1.4 MG/DL — LOW (ref 1.6–2.6)
MCHC RBC-ENTMCNC: 28.6 PG — SIGNIFICANT CHANGE UP (ref 27–34)
MCHC RBC-ENTMCNC: 32.6 GM/DL — SIGNIFICANT CHANGE UP (ref 32–36)
MCV RBC AUTO: 88 FL — SIGNIFICANT CHANGE UP (ref 80–100)
PLATELET # BLD AUTO: 178 K/UL — SIGNIFICANT CHANGE UP (ref 150–400)
POTASSIUM SERPL-MCNC: 4.3 MMOL/L — SIGNIFICANT CHANGE UP (ref 3.5–5.3)
POTASSIUM SERPL-SCNC: 4.3 MMOL/L — SIGNIFICANT CHANGE UP (ref 3.5–5.3)
RBC # BLD: 4.34 M/UL — SIGNIFICANT CHANGE UP (ref 3.8–5.2)
RBC # FLD: 14.4 % — SIGNIFICANT CHANGE UP (ref 10.3–14.5)
SODIUM SERPL-SCNC: 142 MMOL/L — SIGNIFICANT CHANGE UP (ref 135–145)
WBC # BLD: 10.1 K/UL — SIGNIFICANT CHANGE UP (ref 3.8–10.5)
WBC # FLD AUTO: 10.1 K/UL — SIGNIFICANT CHANGE UP (ref 3.8–10.5)

## 2018-04-06 PROCEDURE — 94640 AIRWAY INHALATION TREATMENT: CPT

## 2018-04-06 PROCEDURE — 97116 GAIT TRAINING THERAPY: CPT

## 2018-04-06 PROCEDURE — 85610 PROTHROMBIN TIME: CPT

## 2018-04-06 PROCEDURE — 93306 TTE W/DOPPLER COMPLETE: CPT

## 2018-04-06 PROCEDURE — 96376 TX/PRO/DX INJ SAME DRUG ADON: CPT

## 2018-04-06 PROCEDURE — 97162 PT EVAL MOD COMPLEX 30 MIN: CPT

## 2018-04-06 PROCEDURE — 99239 HOSP IP/OBS DSCHRG MGMT >30: CPT

## 2018-04-06 PROCEDURE — 93880 EXTRACRANIAL BILAT STUDY: CPT

## 2018-04-06 PROCEDURE — 80048 BASIC METABOLIC PNL TOTAL CA: CPT

## 2018-04-06 PROCEDURE — 80061 LIPID PANEL: CPT

## 2018-04-06 PROCEDURE — 84443 ASSAY THYROID STIM HORMONE: CPT

## 2018-04-06 PROCEDURE — 83036 HEMOGLOBIN GLYCOSYLATED A1C: CPT

## 2018-04-06 PROCEDURE — 93005 ELECTROCARDIOGRAM TRACING: CPT

## 2018-04-06 PROCEDURE — 85730 THROMBOPLASTIN TIME PARTIAL: CPT

## 2018-04-06 PROCEDURE — 96372 THER/PROPH/DIAG INJ SC/IM: CPT | Mod: 59

## 2018-04-06 PROCEDURE — 82962 GLUCOSE BLOOD TEST: CPT

## 2018-04-06 PROCEDURE — 70450 CT HEAD/BRAIN W/O DYE: CPT

## 2018-04-06 PROCEDURE — 99285 EMERGENCY DEPT VISIT HI MDM: CPT | Mod: 25

## 2018-04-06 PROCEDURE — 71045 X-RAY EXAM CHEST 1 VIEW: CPT

## 2018-04-06 PROCEDURE — 85027 COMPLETE CBC AUTOMATED: CPT

## 2018-04-06 PROCEDURE — 97530 THERAPEUTIC ACTIVITIES: CPT

## 2018-04-06 PROCEDURE — 96374 THER/PROPH/DIAG INJ IV PUSH: CPT

## 2018-04-06 PROCEDURE — 70544 MR ANGIOGRAPHY HEAD W/O DYE: CPT

## 2018-04-06 PROCEDURE — 70551 MRI BRAIN STEM W/O DYE: CPT

## 2018-04-06 PROCEDURE — 83735 ASSAY OF MAGNESIUM: CPT

## 2018-04-06 RX ORDER — GLYBURIDE 5 MG
1 TABLET ORAL
Qty: 0 | Refills: 0 | COMMUNITY

## 2018-04-06 RX ORDER — DOCUSATE SODIUM 100 MG
1 CAPSULE ORAL
Qty: 0 | Refills: 0 | COMMUNITY
Start: 2018-04-06

## 2018-04-06 RX ORDER — METOPROLOL TARTRATE 50 MG
1 TABLET ORAL
Qty: 0 | Refills: 0 | COMMUNITY
Start: 2018-04-06

## 2018-04-06 RX ORDER — FUROSEMIDE 40 MG
1 TABLET ORAL
Qty: 0 | Refills: 0 | COMMUNITY
Start: 2018-04-06

## 2018-04-06 RX ORDER — ACETAMINOPHEN 500 MG
2 TABLET ORAL
Qty: 0 | Refills: 0 | COMMUNITY
Start: 2018-04-06

## 2018-04-06 RX ORDER — HYDRALAZINE HCL 50 MG
1 TABLET ORAL
Qty: 0 | Refills: 0 | COMMUNITY
Start: 2018-04-06

## 2018-04-06 RX ORDER — HYDRALAZINE HCL 50 MG
1 TABLET ORAL
Qty: 0 | Refills: 0 | COMMUNITY

## 2018-04-06 RX ORDER — METOPROLOL TARTRATE 50 MG
1 TABLET ORAL
Qty: 0 | Refills: 0 | COMMUNITY

## 2018-04-06 RX ORDER — HYDROCORTISONE 1 %
1 OINTMENT (GRAM) TOPICAL
Qty: 0 | Refills: 0 | COMMUNITY
Start: 2018-04-06

## 2018-04-06 RX ORDER — APIXABAN 2.5 MG/1
1 TABLET, FILM COATED ORAL
Qty: 0 | Refills: 0 | COMMUNITY
Start: 2018-04-06

## 2018-04-06 RX ORDER — SENNA PLUS 8.6 MG/1
2 TABLET ORAL
Qty: 0 | Refills: 0 | COMMUNITY
Start: 2018-04-06

## 2018-04-06 RX ORDER — ALLOPURINOL 300 MG
1 TABLET ORAL
Qty: 60 | Refills: 0 | OUTPATIENT
Start: 2018-04-06 | End: 2018-05-05

## 2018-04-06 RX ORDER — SIMVASTATIN 20 MG/1
1 TABLET, FILM COATED ORAL
Qty: 0 | Refills: 0 | COMMUNITY
Start: 2018-04-06

## 2018-04-06 RX ORDER — APIXABAN 2.5 MG/1
1 TABLET, FILM COATED ORAL
Qty: 0 | Refills: 0 | COMMUNITY

## 2018-04-06 RX ORDER — POLYETHYLENE GLYCOL 3350 17 G/17G
17 POWDER, FOR SOLUTION ORAL
Qty: 0 | Refills: 0 | COMMUNITY
Start: 2018-04-06

## 2018-04-06 RX ADMIN — Medication 20 MILLIGRAM(S): at 05:01

## 2018-04-06 RX ADMIN — Medication 6: at 12:27

## 2018-04-06 RX ADMIN — VALSARTAN 160 MILLIGRAM(S): 80 TABLET ORAL at 05:01

## 2018-04-06 RX ADMIN — Medication 50 MILLIGRAM(S): at 05:00

## 2018-04-06 RX ADMIN — APIXABAN 5 MILLIGRAM(S): 2.5 TABLET, FILM COATED ORAL at 05:00

## 2018-04-06 RX ADMIN — Medication 2: at 08:50

## 2018-04-06 RX ADMIN — Medication 50 MILLIGRAM(S): at 05:01

## 2018-04-06 RX ADMIN — Medication 100 MILLIGRAM(S): at 05:01

## 2018-04-06 NOTE — PROVIDER CONTACT NOTE (OTHER) - RECOMMENDATIONS
Spoke with patient regarding the referral to see Dr. Paige. Patient is established with a Rheumatologist at [facility] and is requesting to see Dr. Paige in our Rheum/Derm clinic. I explained to the patient that since she was not establishing care here and plans to keep her Rheumatologist at [facility], that she should see Dr. Paige in the Dermatology. I gave the patient's name to the Dermatology department who will give her a call to set up an appointment.   Ghislaine Franco CMA  9/5/2017 3:48 PM       Will Continue to monitor

## 2018-04-06 NOTE — PROGRESS NOTE ADULT - PROBLEM SELECTOR PLAN 3
-pt currently without pain, if becomes symptomatic recommend Anusol-HC rectal suppository bid, sitz baths bid/tucks  -continue bowel regimen as above  -ensure adequate hydration/dietary fiber intake
-pt currently without pain, if becomes symptomatic recommend Anusol-HC rectal suppository bid, sitz baths bid/tucks  -continue bowel regimen as above  -ensure adequate hydration/dietary fiber intake
Patient nonadherent with anticoagulation medications.   -Continue Eliquis 5mg BID, monitor for signs of bleeding  - TTE as above
d/w neuro and rec to hold ASA for now.  No role for plavix as stent was >1 yr ago.   c/w beta-blocker and statin therapy

## 2018-04-06 NOTE — PROGRESS NOTE ADULT - PROVIDER SPECIALTY LIST ADULT
Cardiology
Cardiology
Gastroenterology
Hospitalist
Hospitalist
Neurology
Cardiology
Neurology
Gastroenterology

## 2018-04-06 NOTE — PROGRESS NOTE ADULT - PROBLEM SELECTOR PLAN 4
-care as per cardiology
-Follow up A1C  -Low dose sliding scale with hypoglycemia protocol  -Accuchecks
-care as per cardiology
d/w neuro and rec to hold ASA for now.  No role for plavix as stent was >1 yr ago.   c/w beta-blocker and statin therapy

## 2018-04-06 NOTE — PROGRESS NOTE ADULT - PROBLEM SELECTOR PROBLEM 4
Afib
Afib
Coronary artery disease involving native coronary artery of native heart without angina pectoris
DMII (diabetes mellitus, type 2)

## 2018-04-06 NOTE — PROGRESS NOTE ADULT - PROBLEM SELECTOR PROBLEM 1
Rectal bleeding
Cerebrovascular accident (CVA), unspecified mechanism
Cerebrovascular accident (CVA), unspecified mechanism
Rectal bleeding

## 2018-04-06 NOTE — PROGRESS NOTE ADULT - PROBLEM SELECTOR PROBLEM 2
Constipation, slow transit
Atrial flutter with rapid ventricular response
Chronic atrial fibrillation
Constipation, slow transit

## 2018-04-06 NOTE — CHART NOTE - NSCHARTNOTEFT_GEN_A_CORE
Pt stable for transfer to [facility]. Please see discharge note for additional information regarding the hospital course and the day of discharge.

## 2018-04-06 NOTE — PROGRESS NOTE ADULT - ASSESSMENT
82F with PMH A fib (on Eliquis), HTN, DMII, CAD w/ stent to LAD (2006) presents with left eye blurry vision and right sided lower extremity weakness since Saturday (4 days ago). Patient also with BRBPR. GI consulted for reports of hematochezia in setting of anticoagulation use.
82F with PMH A fib (on Eliquis), HTN, DMII presents with left eye blurry vision and right sided lower extremity weakness admitted for CVA.
82F with PMH A fib (on Eliquis), HTN, DMII presents with left eye blurry vision and right sided lower extremity weakness admitted for CVA.
82F with PMH A fib (on Eliquis), HTN, DMII remote LAD PCI >10 years ago, presents with left eye blurry vision, right sided lower extremity weakness, CVA.    In the ED temp 97.3, , /73, RR 18, SpO2 99% RA. BUN/Cr 32/1.3. Glucose 228. Chest xray showed clear lungs. CT head showed no acute intracranial hemorrhage, mass effect, or shift of the midline structures. Age indeterminate infarct at the junction of the left thalamus and midbrain. Moderate severity microvascular disease. EKG showed a fib RVR at 115. (03 Apr 2018 16:26)    2D ECHO: 6/2017: hyperdynamic EF, mod MR, mod PH  Lexiscan 7/2017: Low risk study, normal EF    Pt was only taking Eliquis ONCE per day >3 months despite very clear instructions in office at her last visit in December (while accompanied by her son).  Eliquis was prior 5 mg but decreased to 2.5 b/c BRBPR in July 2017 which the pt was non-compliant with seeing GI.  Cont Toprol XL 50 for rate control  Will uptitrate Eliquis to 5 BID. (Already cleared by GI, likely hemmorhoidal bleeding only)  ASA 81 on hold per neuro      Afib  - rapid rate on metoprolol.  Added diltiazem 30 mg q6h.  Received IV cardizem early afternoon on 4/5.  Rate was in the 80s when at 6-7pm, pt had 4 sec pause.  After stopping cardizem, HR increased to 120-130s today.  Pt with buddy pearce, recommend PPM.    D/w pt's son Kyle.  He conferred with Dr Mtz and agrees with plan.  Transfer to [facility] for procedure.
82F with PMH A fib (on Eliquis), HTN, DMII, CAD w/ stent to LAD (2006) presents with left eye blurry vision and right sided lower extremity weakness since Saturday (4 days ago). Patient also with BRBPR. GI consulted for reports of hematochezia in setting of anticoagulation use.

## 2018-04-06 NOTE — PROGRESS NOTE ADULT - SUBJECTIVE AND OBJECTIVE BOX
INTERVAL HPI/OVERNIGHT EVENTS:  pt seen and examined  +bm yesterday, no melena/brbpr per RN  pt denies complains    MEDICATIONS  (STANDING):  apixaban 5 milliGRAM(s) Oral every 12 hours  dextrose 50% Injectable 12.5 Gram(s) IV Push once  dextrose 50% Injectable 25 Gram(s) IV Push once  dextrose 50% Injectable 25 Gram(s) IV Push once  docusate sodium 100 milliGRAM(s) Oral three times a day  furosemide    Tablet 20 milliGRAM(s) Oral daily  hydrALAZINE 50 milliGRAM(s) Oral three times a day  insulin lispro (HumaLOG) corrective regimen sliding scale   SubCutaneous three times a day before meals  metoprolol succinate ER 50 milliGRAM(s) Oral two times a day  polyethylene glycol 3350 17 Gram(s) Oral two times a day  senna 2 Tablet(s) Oral at bedtime  simvastatin 10 milliGRAM(s) Oral at bedtime  valsartan 160 milliGRAM(s) Oral daily    MEDICATIONS  (PRN):  acetaminophen   Tablet 650 milliGRAM(s) Oral every 6 hours PRN For Temp greater than 38 C (100.4 F)  acetaminophen   Tablet. 650 milliGRAM(s) Oral every 6 hours PRN Mild Pain (1 - 3)  dextrose Gel 1 Dose(s) Oral once PRN Blood Glucose LESS THAN 70 milliGRAM(s)/deciliter  glucagon  Injectable 1 milliGRAM(s) IntraMuscular once PRN Glucose LESS THAN 70 milligrams/deciliter  hydrocortisone hemorrhoidal Suppository 1 Suppository(s) Rectal two times a day PRN hemorrhoids  oxyCODONE    5 mG/acetaminophen 325 mG 1 Tablet(s) Oral every 4 hours PRN Severe Pain (7 - 10)  oxyCODONE    5 mG/acetaminophen 325 mG 1 Tablet(s) Oral every 4 hours PRN Moderate Pain (4 - 6)      Allergies    No Known Allergies    Intolerances        Review of Systems:    General:  No wt loss, fevers, chills, night sweats, fatigue   Eyes:  Good vision, no reported pain  ENT:  No sore throat, pain, runny nose, dysphagia  CV:  No pain, palpitations, hypo/hypertension  Resp:  No dyspnea, cough, tachypnea, wheezing  GI:  No pain, No nausea, No vomiting, No diarrhea, No constipation, No weight loss, No fever, No pruritis, No rectal bleeding, No melena, No dysphagia  :  No pain, bleeding, incontinence, nocturia  Muscle:  No pain, weakness  Neuro:  No weakness, tingling, memory problems  Psych:  No fatigue, insomnia, mood problems, depression  Endocrine:  No polyuria, polydypsia, cold/heat intolerance  Heme:  No petechiae, ecchymosis, easy bruisability  Skin:  No rash, tattoos, scars, edema      Vital Signs Last 24 Hrs  T(C): 37.2 (06 Apr 2018 07:29), Max: 37.3 (06 Apr 2018 04:30)  T(F): 98.9 (06 Apr 2018 07:29), Max: 99.1 (06 Apr 2018 04:30)  HR: 104 (06 Apr 2018 07:29) (59 - 114)  BP: 116/97 (06 Apr 2018 07:29) (116/97 - 169/82)  BP(mean): --  RR: 17 (06 Apr 2018 07:29) (17 - 24)  SpO2: 97% (06 Apr 2018 07:29) (95% - 100%)    PHYSICAL EXAM:    Constitutional: NAD, lying in bed, hard of hearinf  HEENT: EOMI, throat clear  Neck: No LAD, supple  Respiratory: CTA b/l   Cardiovascular: irreg  Gastrointestinal: obese abd, BS+, soft, NT/ND, neg HSM,  Extremities: +peripheral edema,   Vascular: 2+ peripheral pulses  Neurological: A/O x 3, no focal deficits, L eye ptosis  Psychiatric: Normal mood, normal affect  Skin: No rashes      LABS:                        12.4   10.1  )-----------( 178      ( 06 Apr 2018 06:35 )             38.2     04-06    142  |  108  |  33<H>  ----------------------------<  194<H>  4.3   |  25  |  1.30    Ca    8.5      06 Apr 2018 06:36  Mg     1.4     04-06            RADIOLOGY & ADDITIONAL TESTS:

## 2018-04-06 NOTE — PROGRESS NOTE ADULT - PROBLEM SELECTOR PLAN 1
-likely secondary to hemorrhoids given history and stable hemoglobin  -no overt signs/symptoms of GI bleeding  -no GI contraindication to anticoagulation  -continue to trend h/h, sremains table   -monitor stool color  -if pt develops s/s of overt GIB will offer egd/colonoscopy and recommend holding of anticoagulation

## 2018-04-06 NOTE — PROGRESS NOTE ADULT - SUBJECTIVE AND OBJECTIVE BOX
Patient is a 82y Female with a known history of :  Atrial flutter with rapid ventricular response (I48.92)  Hemorrhoids (K64.9)  Constipation, slow transit (K59.01)  Rectal bleeding (K62.5)  Obesity (BMI 30-39.9) (E66.9)  Essential hypertension (I10)  Chronic atrial fibrillation (I48.2)  Hematochezia (K92.1)  Coronary artery disease involving native coronary artery of native heart without angina pectoris (I25.10)  Prophylactic measure (Z29.9)  HTN (hypertension) (I10)  DMII (diabetes mellitus, type 2) (E11.9)  Afib (I48.91)  Cerebrovascular accident (CVA), unspecified mechanism (I63.9)      Pt seen this am at 820 am.  Pt confused w/o distress.      HPI:  82F with PMH A fib (on Eliquis), HTN, DMII, CAD w/ stent to LAD (2006) presents with left eye blurry vision and right sided lower extremity weakness since Saturday (4 days ago). Patient reports feeling unsteady on Saturday and "slowly went down" after standing up from her couch. Denies LOC. Denies head trauma. Was able to lift herself back up and go to bed. Son came in and noticed patient's left eye was closed and patient reported blurry vision. Son used hot compresses on left eye with minimal relief. Patient reports feeling unsteady on Sunday and leaning toward her right side. Patient "went down" on Sunday and today. Denies actually falling, reports feeling unsteady and slowly going to the floor. She is usually unable to get herself up after being on the floor and son has to help her. Per son, she rarely falls. Last fall was 1 year ago, and son assisted her in getting up at that time. Patient usually ambulates without a cane or walker, uses surroundings for support and walks slowly. Patient able to use stairs. Per son, patient saw Dr. Mtz a few months ago and had a nuclear stress test which was normal. Patient reports decreasing Eliquis to 2.5mg daily from 5mg daily because she noticed BRBPR. Of note, Eliquis bottle says 2.5mg BID; but patient reports only taking medication once a day. Patient also reports using Hydralazine BID, bottle says Hydralazine TID.     In the ED temp 97.3, , /73, RR 18, SpO2 99% RA. BUN/Cr 32/1.3. Glucose 228. Chest xray showed clear lungs. CT head showed no acute intracranial hemorrhage, mass effect, or shift of the midline structures. Age indeterminate infarct at the junction of the left thalamus and midbrain. Moderate severity microvascular disease. EKG showed a fib RVR at 115. (03 Apr 2018 16:26)      REVIEW OF SYSTEMS:    CONSTITUTIONAL: No weakness, fevers or chills.   EYES/ENT: No visual changes;  No vertigo or throat pain   NECK: No pain or stiffness  RESPIRATORY: No cough, wheezing, hemoptysis; No shortness of breath  CARDIOVASCULAR: No chest pain or palpitations  GASTROINTESTINAL: No abdominal or epigastric pain. No nausea, vomiting, or hematemesis; No diarrhea or constipation. No melena or hematochezia.  GENITOURINARY: No dysuria, frequency or hematuria  NEUROLOGICAL: No numbness or weakness  SKIN: No itching, burning, rashes, or lesions       MEDICATIONS  (STANDING):  apixaban 5 milliGRAM(s) Oral every 12 hours  dextrose 50% Injectable 12.5 Gram(s) IV Push once  dextrose 50% Injectable 25 Gram(s) IV Push once  dextrose 50% Injectable 25 Gram(s) IV Push once  docusate sodium 100 milliGRAM(s) Oral three times a day  furosemide    Tablet 20 milliGRAM(s) Oral daily  hydrALAZINE 50 milliGRAM(s) Oral three times a day  insulin lispro (HumaLOG) corrective regimen sliding scale   SubCutaneous three times a day before meals  metoprolol succinate ER 50 milliGRAM(s) Oral two times a day  polyethylene glycol 3350 17 Gram(s) Oral two times a day  senna 2 Tablet(s) Oral at bedtime  simvastatin 10 milliGRAM(s) Oral at bedtime  valsartan 160 milliGRAM(s) Oral daily    MEDICATIONS  (PRN):  acetaminophen   Tablet 650 milliGRAM(s) Oral every 6 hours PRN For Temp greater than 38 C (100.4 F)  acetaminophen   Tablet. 650 milliGRAM(s) Oral every 6 hours PRN Mild Pain (1 - 3)  dextrose Gel 1 Dose(s) Oral once PRN Blood Glucose LESS THAN 70 milliGRAM(s)/deciliter  glucagon  Injectable 1 milliGRAM(s) IntraMuscular once PRN Glucose LESS THAN 70 milligrams/deciliter  hydrocortisone hemorrhoidal Suppository 1 Suppository(s) Rectal two times a day PRN hemorrhoids  oxyCODONE    5 mG/acetaminophen 325 mG 1 Tablet(s) Oral every 4 hours PRN Severe Pain (7 - 10)  oxyCODONE    5 mG/acetaminophen 325 mG 1 Tablet(s) Oral every 4 hours PRN Moderate Pain (4 - 6)      ALLERGIES: No Known Allergies      FAMILY HISTORY:  No pertinent family history in first degree relatives      PHYSICAL EXAMINATION:  -----------------------------  T(C): 37.4 (04-06-18 @ 12:19), Max: 37.4 (04-06-18 @ 12:19)  HR: 104 (04-06-18 @ 12:19) (91 - 114)  BP: 162/78 (04-06-18 @ 12:19) (116/97 - 162/78)  RR: 18 (04-06-18 @ 12:19) (17 - 19)  SpO2: 99% (04-06-18 @ 12:19) (97% - 99%)  Wt(kg): --    PHYSICAL EXAM:    Constitutional: NAD  Neurological: Alert and oriented  HEENT: EOMI, no JVD  Cardiovascular: S1 and S2, irr, 1/6 sys murmur  Pulmonary: breath sounds bilaterally dec at bases  Gastrointestinal: Bowel Sounds present, soft, nontender  Ext: peripheral edema, mild B/L pitting  Skin: No rashes, No cyanosis.  Psych:  Mood & affect appropriate           LABS:   --------  04-06    142  |  108  |  33<H>  ----------------------------<  194<H>  4.3   |  25  |  1.30    Ca    8.5      06 Apr 2018 06:36  Mg     1.4     04-06                           12.4   10.1  )-----------( 178      ( 06 Apr 2018 06:35 )             38.2

## 2018-04-06 NOTE — PROVIDER CONTACT NOTE (OTHER) - ASSESSMENT
A fib on the monitor 80's to 90's .
's to 150's  patient asymptomatic
HR rapid a fib 151 burst to A  fib low 100's.  patient asymptomatic laying in  bed

## 2018-04-06 NOTE — PROGRESS NOTE ADULT - PROBLEM SELECTOR PROBLEM 5
Cerebrovascular accident (CVA), unspecified mechanism
Cerebrovascular accident (CVA), unspecified mechanism
DMII (diabetes mellitus, type 2)
Essential hypertension

## 2018-04-06 NOTE — PROGRESS NOTE ADULT - PROBLEM SELECTOR PROBLEM 3
Hemorrhoids
Chronic atrial fibrillation
Coronary artery disease involving native coronary artery of native heart without angina pectoris
Hemorrhoids

## 2021-09-14 NOTE — ED PROVIDER NOTE - CPE EDP GASTRO NORM
Central Line      Staff  Anesthesiologist: Ted Ken MD  Preanesthetic Checklist  Completed: patient identified, IV checked, site marked, risks and benefits discussed, surgical consent, monitors and equipment checked, pre-op evaluation and timeout performed  Central Line Prep  Sterile Tech:gloves, cap, gown, mask and sterile barriers  Prep: chloraprep  Patient monitoring: blood pressure monitoring, continuous pulse oximetry and EKG  Central Line Procedure  Laterality:right  Location:internal jugular  Catheter Type:McDavid-GanzImages: still images not obtained  Assessment  Post procedure:biopatch applied, line sutured and occlusive dressing applied  Assessement:blood return through all ports, free fluid flow and chest x-ray ordered  Complications:no  Patient Tolerance:patient tolerated the procedure well with no apparent complications             normal...

## 2022-08-03 NOTE — PROGRESS NOTE ADULT - PROBLEM SELECTOR PLAN 8
No
IMPROVE VTE Individual Risk Assessment          RISK                                                          Points  [  ] Previous VTE                                                3  [  ] Thrombophilia                                             2  [  ] Lower limb paralysis                                   2        (unable to hold up >15 seconds)    [  ] Current Cancer                                             2         (within 6 months)  [  ] Immobilization > 24 hrs                              1  [  ] ICU/CCU stay > 24 hours                             1  [ X ] Age > 60                                                         1    IMPROVE VTE Score: 1    DVT prophylaxis: On Eliquis  fall precautions

## 2024-03-24 NOTE — PHYSICAL THERAPY INITIAL EVALUATION ADULT - MUSCLE TONE ASSESSMENT, REHAB EVAL
Chest pressure and intermittent shortness of breath bilateral lower extremities/mildly decreased tone/bilateral upper extremities